# Patient Record
Sex: FEMALE | Race: WHITE | Employment: UNEMPLOYED | URBAN - NONMETROPOLITAN AREA
[De-identification: names, ages, dates, MRNs, and addresses within clinical notes are randomized per-mention and may not be internally consistent; named-entity substitution may affect disease eponyms.]

---

## 2017-05-05 ENCOUNTER — INITIAL PRENATAL (OUTPATIENT)
Dept: OBGYN | Age: 37
End: 2017-05-05
Payer: MEDICAID

## 2017-05-05 ENCOUNTER — HOSPITAL ENCOUNTER (OUTPATIENT)
Dept: ULTRASOUND IMAGING | Age: 37
Discharge: HOME | End: 2017-05-05
Payer: MEDICAID

## 2017-05-05 VITALS — HEART RATE: 88 BPM | WEIGHT: 255 LBS | DIASTOLIC BLOOD PRESSURE: 92 MMHG | SYSTOLIC BLOOD PRESSURE: 145 MMHG

## 2017-05-05 DIAGNOSIS — Z34.00 SUPERVISION OF NORMAL FIRST PREGNANCY, ANTEPARTUM: ICD-10-CM

## 2017-05-05 DIAGNOSIS — O10.919 CHRONIC HYPERTENSION IN PREGNANCY: ICD-10-CM

## 2017-05-05 DIAGNOSIS — O09.519: ICD-10-CM

## 2017-05-05 DIAGNOSIS — R12 HEARTBURN DURING PREGNANCY, UNSPECIFIED TRIMESTER: ICD-10-CM

## 2017-05-05 DIAGNOSIS — N91.2 AMENORRHEA: Primary | ICD-10-CM

## 2017-05-05 DIAGNOSIS — O21.9 NAUSEA AND VOMITING DURING PREGNANCY PRIOR TO 22 WEEKS GESTATION: ICD-10-CM

## 2017-05-05 DIAGNOSIS — Z36.89 CONFIRM FETAL CARDIAC ACTIVITY USING ULTRASOUND: ICD-10-CM

## 2017-05-05 DIAGNOSIS — O26.899 HEARTBURN DURING PREGNANCY, UNSPECIFIED TRIMESTER: ICD-10-CM

## 2017-05-05 LAB
ABO/RH: NORMAL
ANTIBODY SCREEN: NEGATIVE
CHLAMYDIA TRACHOMATIS AMPLIFIED DET: NEGATIVE
CONTROL: ABNORMAL
HCT: 40.4 %
HEPATITIS B SURFACE ANTIGEN INTERPRETATION: NORMAL
HGB: 13.5
HIV-1 AND HIV-2 ANTIBODIES: NORMAL
N GONORRHOEAE AMPLIFIED DET: NEGATIVE
PREGNANCY TEST URINE, POC: POSITIVE
RPR: NORMAL
RUBELLA ANTIBODY IGG: NORMAL
URINE PROTEIN CONC: NORMAL

## 2017-05-05 PROCEDURE — 99203 OFFICE O/P NEW LOW 30 MIN: CPT | Performed by: NURSE PRACTITIONER

## 2017-05-05 PROCEDURE — 81025 URINE PREGNANCY TEST: CPT | Performed by: NURSE PRACTITIONER

## 2017-05-05 PROCEDURE — 76817 TRANSVAGINAL US OBSTETRIC: CPT

## 2017-05-05 RX ORDER — PANTOPRAZOLE SODIUM 20 MG/1
20 TABLET, DELAYED RELEASE ORAL DAILY
Qty: 30 TABLET | Refills: 5 | Status: SHIPPED | OUTPATIENT
Start: 2017-05-05 | End: 2017-10-28 | Stop reason: SDUPTHER

## 2017-05-05 RX ORDER — ONDANSETRON 4 MG/1
4 TABLET, FILM COATED ORAL EVERY 4 HOURS PRN
Qty: 90 TABLET | Refills: 5 | Status: SHIPPED | OUTPATIENT
Start: 2017-05-05 | End: 2018-01-02

## 2017-05-05 RX ORDER — LABETALOL 100 MG/1
100 TABLET, FILM COATED ORAL 2 TIMES DAILY
Qty: 60 TABLET | Refills: 3 | Status: SHIPPED | OUTPATIENT
Start: 2017-05-05 | End: 2017-08-04 | Stop reason: SDUPTHER

## 2017-05-09 DIAGNOSIS — N39.0 URINARY TRACT INFECTION, SITE UNSPECIFIED: Primary | ICD-10-CM

## 2017-05-09 RX ORDER — NITROFURANTOIN 25; 75 MG/1; MG/1
100 CAPSULE ORAL 2 TIMES DAILY
Qty: 14 CAPSULE | Refills: 0 | Status: SHIPPED | OUTPATIENT
Start: 2017-05-09 | End: 2017-05-16

## 2017-05-12 ENCOUNTER — ROUTINE PRENATAL (OUTPATIENT)
Dept: OBGYN | Age: 37
End: 2017-05-12
Payer: MEDICAID

## 2017-05-12 VITALS
HEIGHT: 62 IN | DIASTOLIC BLOOD PRESSURE: 70 MMHG | SYSTOLIC BLOOD PRESSURE: 138 MMHG | BODY MASS INDEX: 47.11 KG/M2 | WEIGHT: 256 LBS

## 2017-05-12 DIAGNOSIS — O10.019 PRE-EXISTING ESSENTIAL HYPERTENSION DURING PREGNANCY, ANTEPARTUM: ICD-10-CM

## 2017-05-12 DIAGNOSIS — Z3A.10 10 WEEKS GESTATION OF PREGNANCY: Primary | ICD-10-CM

## 2017-05-12 PROCEDURE — 99213 OFFICE O/P EST LOW 20 MIN: CPT | Performed by: OBSTETRICS & GYNECOLOGY

## 2017-05-12 RX ORDER — AMPICILLIN 500 MG/1
500 CAPSULE ORAL 4 TIMES DAILY
Qty: 28 CAPSULE | Refills: 0 | Status: SHIPPED | OUTPATIENT
Start: 2017-05-12 | End: 2017-05-19

## 2017-05-16 ENCOUNTER — TELEPHONE (OUTPATIENT)
Dept: OBGYN | Age: 37
End: 2017-05-16

## 2017-05-16 DIAGNOSIS — O10.911 PRE-EXISTING HYPERTENSION AFFECTING PREGNANCY IN FIRST TRIMESTER: Primary | ICD-10-CM

## 2017-05-16 LAB
24HR URINE VOLUME (ML): 1100 ML
CREATININE 24 HOUR URINE: 1.3 G/24HR (ref 1–2)
PROTEIN 24 HOUR URINE: 165 MG/24HR (ref 50–100)

## 2017-05-22 ENCOUNTER — TELEPHONE (OUTPATIENT)
Dept: OBGYN | Age: 37
End: 2017-05-22

## 2017-06-01 ENCOUNTER — TELEPHONE (OUTPATIENT)
Dept: OBGYN | Age: 37
End: 2017-06-01

## 2017-06-13 ENCOUNTER — ROUTINE PRENATAL (OUTPATIENT)
Dept: OBGYN | Age: 37
End: 2017-06-13
Payer: MEDICAID

## 2017-06-13 VITALS — SYSTOLIC BLOOD PRESSURE: 136 MMHG | WEIGHT: 254 LBS | BODY MASS INDEX: 46.46 KG/M2 | DIASTOLIC BLOOD PRESSURE: 82 MMHG

## 2017-06-13 DIAGNOSIS — Z34.02 SUPERVISION OF NORMAL FIRST PREGNANCY IN SECOND TRIMESTER: Primary | ICD-10-CM

## 2017-06-13 PROCEDURE — 99212 OFFICE O/P EST SF 10 MIN: CPT | Performed by: OBSTETRICS & GYNECOLOGY

## 2017-06-13 RX ORDER — FLUOXETINE HYDROCHLORIDE 40 MG/1
40 CAPSULE ORAL DAILY
Qty: 30 CAPSULE | Refills: 3 | Status: SHIPPED | OUTPATIENT
Start: 2017-06-13 | End: 2017-10-01 | Stop reason: SDUPTHER

## 2017-06-13 RX ORDER — FERROUS SULFATE 325(65) MG
325 TABLET ORAL
Qty: 90 TABLET | Refills: 3 | Status: SHIPPED | OUTPATIENT
Start: 2017-06-13 | End: 2018-05-18 | Stop reason: SDUPTHER

## 2017-07-11 ENCOUNTER — ROUTINE PRENATAL (OUTPATIENT)
Dept: OBGYN | Age: 37
End: 2017-07-11
Payer: MEDICAID

## 2017-07-11 VITALS — SYSTOLIC BLOOD PRESSURE: 120 MMHG | DIASTOLIC BLOOD PRESSURE: 78 MMHG | BODY MASS INDEX: 45.54 KG/M2 | WEIGHT: 249 LBS

## 2017-07-11 DIAGNOSIS — Z3A.18 18 WEEKS GESTATION OF PREGNANCY: ICD-10-CM

## 2017-07-11 DIAGNOSIS — Z34.02 SUPERVISION OF NORMAL FIRST PREGNANCY IN SECOND TRIMESTER: Primary | ICD-10-CM

## 2017-07-11 PROCEDURE — 99212 OFFICE O/P EST SF 10 MIN: CPT | Performed by: OBSTETRICS & GYNECOLOGY

## 2017-07-17 ENCOUNTER — TELEPHONE (OUTPATIENT)
Dept: OBGYN | Age: 37
End: 2017-07-17

## 2017-07-18 ENCOUNTER — TELEPHONE (OUTPATIENT)
Dept: OBGYN | Age: 37
End: 2017-07-18

## 2017-07-25 ENCOUNTER — HOSPITAL ENCOUNTER (OUTPATIENT)
Dept: ULTRASOUND IMAGING | Age: 37
Discharge: HOME | End: 2017-07-25
Payer: MEDICAID

## 2017-07-25 DIAGNOSIS — Z34.02 SUPERVISION OF NORMAL FIRST PREGNANCY IN SECOND TRIMESTER: ICD-10-CM

## 2017-07-25 PROCEDURE — 76805 OB US >/= 14 WKS SNGL FETUS: CPT

## 2017-08-04 DIAGNOSIS — O10.919 CHRONIC HYPERTENSION IN PREGNANCY: ICD-10-CM

## 2017-08-04 RX ORDER — LABETALOL 100 MG/1
TABLET, FILM COATED ORAL
Qty: 60 TABLET | Refills: 2 | Status: SHIPPED | OUTPATIENT
Start: 2017-08-04 | End: 2017-09-29 | Stop reason: SDUPTHER

## 2017-08-08 ENCOUNTER — ROUTINE PRENATAL (OUTPATIENT)
Dept: OBGYN | Age: 37
End: 2017-08-08
Payer: MEDICAID

## 2017-08-08 VITALS
HEART RATE: 88 BPM | BODY MASS INDEX: 44.99 KG/M2 | SYSTOLIC BLOOD PRESSURE: 131 MMHG | DIASTOLIC BLOOD PRESSURE: 77 MMHG | WEIGHT: 246 LBS

## 2017-08-08 DIAGNOSIS — Z34.02 SUPERVISION OF NORMAL FIRST PREGNANCY IN SECOND TRIMESTER: Primary | ICD-10-CM

## 2017-08-08 PROCEDURE — 99212 OFFICE O/P EST SF 10 MIN: CPT | Performed by: OBSTETRICS & GYNECOLOGY

## 2017-09-05 ENCOUNTER — ROUTINE PRENATAL (OUTPATIENT)
Dept: OBGYN | Age: 37
End: 2017-09-05
Payer: MEDICAID

## 2017-09-05 ENCOUNTER — HOSPITAL ENCOUNTER (OUTPATIENT)
Dept: LABOR AND DELIVERY | Age: 37
Discharge: HOME | End: 2017-09-05
Payer: MEDICAID

## 2017-09-05 VITALS
WEIGHT: 248 LBS | HEART RATE: 92 BPM | SYSTOLIC BLOOD PRESSURE: 136 MMHG | DIASTOLIC BLOOD PRESSURE: 80 MMHG | BODY MASS INDEX: 45.36 KG/M2

## 2017-09-05 DIAGNOSIS — Z67.41 TYPE O BLOOD, RH NEGATIVE: ICD-10-CM

## 2017-09-05 DIAGNOSIS — Z34.92 PRENATAL CARE IN SECOND TRIMESTER: Primary | ICD-10-CM

## 2017-09-05 LAB
ABO/RH: NORMAL
ANTIBODY SCREEN: NORMAL
RHIG LOT NUMBER: NORMAL

## 2017-09-05 PROCEDURE — 6360000002 HC RX W HCPCS: Performed by: NURSE PRACTITIONER

## 2017-09-05 PROCEDURE — 99213 OFFICE O/P EST LOW 20 MIN: CPT | Performed by: NURSE PRACTITIONER

## 2017-09-05 PROCEDURE — 96372 THER/PROPH/DIAG INJ SC/IM: CPT

## 2017-09-05 RX ADMIN — HUMAN RHO(D) IMMUNE GLOBULIN 300 MCG: 300 INJECTION, SOLUTION INTRAMUSCULAR at 20:00

## 2017-09-07 ENCOUNTER — TELEPHONE (OUTPATIENT)
Dept: OBGYN | Age: 37
End: 2017-09-07

## 2017-09-07 DIAGNOSIS — Z34.92 PRENATAL CARE IN SECOND TRIMESTER: ICD-10-CM

## 2017-09-19 ENCOUNTER — ROUTINE PRENATAL (OUTPATIENT)
Dept: OBGYN | Age: 37
End: 2017-09-19
Payer: MEDICAID

## 2017-09-19 VITALS — DIASTOLIC BLOOD PRESSURE: 84 MMHG | BODY MASS INDEX: 45.36 KG/M2 | SYSTOLIC BLOOD PRESSURE: 140 MMHG | WEIGHT: 248 LBS

## 2017-09-19 DIAGNOSIS — O24.415 GESTATIONAL DIABETES MELLITUS (GDM) CONTROLLED ON ORAL HYPOGLYCEMIC DRUG, ANTEPARTUM: Primary | ICD-10-CM

## 2017-09-19 PROCEDURE — 59025 FETAL NON-STRESS TEST: CPT | Performed by: OBSTETRICS & GYNECOLOGY

## 2017-09-19 PROCEDURE — 99212 OFFICE O/P EST SF 10 MIN: CPT | Performed by: OBSTETRICS & GYNECOLOGY

## 2017-09-19 RX ORDER — GLYBURIDE 2.5 MG/1
2.5 TABLET ORAL 2 TIMES DAILY WITH MEALS
Qty: 60 TABLET | Refills: 5 | Status: SHIPPED | OUTPATIENT
Start: 2017-09-19 | End: 2017-10-31 | Stop reason: SDUPTHER

## 2017-09-20 RX ORDER — VALACYCLOVIR HYDROCHLORIDE 500 MG/1
500 TABLET, FILM COATED ORAL 2 TIMES DAILY
Qty: 30 TABLET | Refills: 0 | Status: ON HOLD | OUTPATIENT
Start: 2017-09-20 | End: 2017-11-20 | Stop reason: HOSPADM

## 2017-09-29 ENCOUNTER — ROUTINE PRENATAL (OUTPATIENT)
Dept: OBGYN | Age: 37
End: 2017-09-29
Payer: MEDICAID

## 2017-09-29 VITALS — DIASTOLIC BLOOD PRESSURE: 90 MMHG | BODY MASS INDEX: 45.73 KG/M2 | SYSTOLIC BLOOD PRESSURE: 154 MMHG | WEIGHT: 250 LBS

## 2017-09-29 DIAGNOSIS — O24.415 GESTATIONAL DIABETES MELLITUS (GDM) IN THIRD TRIMESTER CONTROLLED ON ORAL HYPOGLYCEMIC DRUG: Primary | ICD-10-CM

## 2017-09-29 DIAGNOSIS — O10.919 CHRONIC HYPERTENSION IN PREGNANCY: ICD-10-CM

## 2017-09-29 PROCEDURE — 99212 OFFICE O/P EST SF 10 MIN: CPT | Performed by: OBSTETRICS & GYNECOLOGY

## 2017-09-29 RX ORDER — LABETALOL 100 MG/1
200 TABLET, FILM COATED ORAL 2 TIMES DAILY
Qty: 120 TABLET | Refills: 5 | Status: ON HOLD | OUTPATIENT
Start: 2017-09-29 | End: 2017-11-20 | Stop reason: HOSPADM

## 2017-10-02 RX ORDER — FLUOXETINE HYDROCHLORIDE 40 MG/1
CAPSULE ORAL
Qty: 30 CAPSULE | Refills: 0 | Status: SHIPPED | OUTPATIENT
Start: 2017-10-02 | End: 2017-10-29 | Stop reason: SDUPTHER

## 2017-10-06 ENCOUNTER — ROUTINE PRENATAL (OUTPATIENT)
Dept: OBGYN | Age: 37
End: 2017-10-06
Payer: MEDICAID

## 2017-10-06 VITALS — DIASTOLIC BLOOD PRESSURE: 80 MMHG | BODY MASS INDEX: 45.54 KG/M2 | SYSTOLIC BLOOD PRESSURE: 130 MMHG | WEIGHT: 249 LBS

## 2017-10-06 DIAGNOSIS — O24.415 GESTATIONAL DIABETES MELLITUS IN PREGNANCY, CONTROLLED BY ORAL HYPOGLYCEMIC DRUGS: Primary | ICD-10-CM

## 2017-10-06 DIAGNOSIS — O10.919 CHRONIC HYPERTENSION IN PREGNANCY: ICD-10-CM

## 2017-10-06 PROCEDURE — 99212 OFFICE O/P EST SF 10 MIN: CPT | Performed by: OBSTETRICS & GYNECOLOGY

## 2017-10-06 NOTE — PATIENT INSTRUCTIONS
Weeks 30 to 32 of Your Pregnancy: Care Instructions  Your Care Instructions    You have made it to the final months of your pregnancy. By now, your baby is really starting to look like a baby, with hair and plump skin. As you enter the final weeks of pregnancy, the reality of having a baby may start to set in. This is the time to settle on a name, get your household in order, set up a safe nursery, and find quality  if needed. Doing these things in advance will allow you to focus on caring for and enjoying your new baby. You may also want to have a tour of your hospital's labor and delivery unit to get a better idea of what to expect while you are in the hospital.  During these last months, it is very important to take good care of yourself and pay attention to what your body needs. If your doctor says it is okay for you to work, don't push yourself too hard. Use the tips provided in this care sheet to ease heartburn and care for varicose veins. If you haven't already had the Tdap shot during this pregnancy, talk to your doctor about getting it. It will help protect your  against pertussis infection. Follow-up care is a key part of your treatment and safety. Be sure to make and go to all appointments, and call your doctor if you are having problems. It's also a good idea to know your test results and keep a list of the medicines you take. How can you care for yourself at home? Pay attention to your baby's movements  · You should feel your baby move several times every day. · Your baby now turns less, and kicks and jabs more. · Your baby sleeps 20 to 45 minutes at a time and is more active at certain times of day. · If your doctor wants you to count your baby's kicks:  ¨ Empty your bladder, and lie on your side or relax in a comfortable chair. ¨ Write down your start time. ¨ Pay attention only to your baby's movements. Count any movement except hiccups.   ¨ After you have counted 10

## 2017-10-11 ENCOUNTER — ROUTINE PRENATAL (OUTPATIENT)
Dept: OBGYN | Age: 37
End: 2017-10-11
Payer: MEDICAID

## 2017-10-11 VITALS
SYSTOLIC BLOOD PRESSURE: 119 MMHG | WEIGHT: 249 LBS | DIASTOLIC BLOOD PRESSURE: 65 MMHG | BODY MASS INDEX: 45.54 KG/M2 | HEART RATE: 84 BPM

## 2017-10-11 DIAGNOSIS — O24.415 GESTATIONAL DIABETES MELLITUS IN PREGNANCY, CONTROLLED BY ORAL HYPOGLYCEMIC DRUGS: Primary | ICD-10-CM

## 2017-10-11 DIAGNOSIS — O10.919 CHRONIC HYPERTENSION IN PREGNANCY: ICD-10-CM

## 2017-10-11 PROCEDURE — 59025 FETAL NON-STRESS TEST: CPT | Performed by: OBSTETRICS & GYNECOLOGY

## 2017-10-11 PROCEDURE — 99212 OFFICE O/P EST SF 10 MIN: CPT | Performed by: OBSTETRICS & GYNECOLOGY

## 2017-10-11 NOTE — PATIENT INSTRUCTIONS
We are committed to providing you with the best care possible. In order to help us achieve these goals please remember to bring all medications, herbal products, and over the counter supplements with you to each visit. If your provider has ordered testing for you, please be sure to follow up with our office if you have not received results within 7 days after testing took place. *If you receive a survey after visiting one of our offices, please take the time to share your experience concerning your physician office visit. These surveys are confidential and no health information about you is shared. We are eager to improve for you and we are counting on your feedback to help make that happen.

## 2017-10-20 ENCOUNTER — ROUTINE PRENATAL (OUTPATIENT)
Dept: OBGYN | Age: 37
End: 2017-10-20
Payer: MEDICAID

## 2017-10-20 VITALS
HEART RATE: 85 BPM | WEIGHT: 250 LBS | DIASTOLIC BLOOD PRESSURE: 60 MMHG | SYSTOLIC BLOOD PRESSURE: 126 MMHG | BODY MASS INDEX: 45.73 KG/M2

## 2017-10-20 DIAGNOSIS — O24.415 GESTATIONAL DIABETES MELLITUS IN PREGNANCY, CONTROLLED BY ORAL HYPOGLYCEMIC DRUGS: Primary | ICD-10-CM

## 2017-10-20 DIAGNOSIS — O10.919 CHRONIC HYPERTENSION AFFECTING PREGNANCY: ICD-10-CM

## 2017-10-20 PROCEDURE — 99212 OFFICE O/P EST SF 10 MIN: CPT | Performed by: OBSTETRICS & GYNECOLOGY

## 2017-10-20 NOTE — PATIENT INSTRUCTIONS
Weeks 32 to 34 of Your Pregnancy: Care Instructions  Your Care Instructions    During the last few weeks of your pregnancy, you may have more aches and pains. It's important to rest when you can. Your growing baby is putting more pressure on your bladder. So you may need to urinate more often. Hemorrhoids are also common. These are painful, itchy veins in the rectal area. In the 36th week, most women have a test for group B streptococcus (GBS). GBS is a common bacteria that can live in the vagina and rectum. It can make your baby sick after birth. If you test positive, you will get antibiotics during labor. These will keep your baby from getting the bacteria. You may want to talk with your doctor about banking your baby's umbilical cord blood. This is the blood left in the cord after birth. If you want to save this blood, you must arrange it ahead of time. You can't decide at the last minute. If you haven't already had the Tdap shot during this pregnancy, talk to your doctor about getting it. It will help protect your  against pertussis infection. Follow-up care is a key part of your treatment and safety. Be sure to make and go to all appointments, and call your doctor if you are having problems. It's also a good idea to know your test results and keep a list of the medicines you take. How can you care for yourself at home? Ease hemorrhoids  · Get more liquids, fruits, vegetables, and fiber in your diet. This will help keep your stools soft. · Avoid sitting for too long. Lie on your left side several times a day. · Clean yourself with soft, moist toilet paper. Or you can use witch hazel pads or personal hygiene pads. · If you are uncomfortable, try ice packs. Or you can sit in a warm sitz bath. Do these for 20 minutes at a time, as needed. · Use hydrocortisone cream for pain and itching. Two examples are Anusol and Preparation H Hydrocortisone.   · Ask your doctor about taking an Incorporated disclaims any warranty or liability for your use of this information.

## 2017-10-30 RX ORDER — PANTOPRAZOLE SODIUM 20 MG/1
TABLET, DELAYED RELEASE ORAL
Qty: 30 TABLET | Refills: 5 | Status: ON HOLD | OUTPATIENT
Start: 2017-10-30 | End: 2017-11-20 | Stop reason: HOSPADM

## 2017-10-30 RX ORDER — FLUOXETINE HYDROCHLORIDE 40 MG/1
CAPSULE ORAL
Qty: 30 CAPSULE | Refills: 0 | Status: SHIPPED | OUTPATIENT
Start: 2017-10-30 | End: 2017-12-22 | Stop reason: SDUPTHER

## 2017-10-31 ENCOUNTER — ROUTINE PRENATAL (OUTPATIENT)
Dept: OBGYN | Age: 37
End: 2017-10-31
Payer: MEDICAID

## 2017-10-31 VITALS
SYSTOLIC BLOOD PRESSURE: 133 MMHG | WEIGHT: 251 LBS | BODY MASS INDEX: 45.91 KG/M2 | HEART RATE: 92 BPM | DIASTOLIC BLOOD PRESSURE: 82 MMHG

## 2017-10-31 DIAGNOSIS — O24.415 GESTATIONAL DIABETES MELLITUS IN PREGNANCY, CONTROLLED BY ORAL HYPOGLYCEMIC DRUGS: Primary | ICD-10-CM

## 2017-10-31 DIAGNOSIS — O10.919 CHRONIC HYPERTENSION DURING PREGNANCY: ICD-10-CM

## 2017-10-31 PROCEDURE — 99212 OFFICE O/P EST SF 10 MIN: CPT | Performed by: OBSTETRICS & GYNECOLOGY

## 2017-10-31 PROCEDURE — 59025 FETAL NON-STRESS TEST: CPT | Performed by: OBSTETRICS & GYNECOLOGY

## 2017-10-31 RX ORDER — GLYBURIDE 2.5 MG/1
TABLET ORAL
Qty: 90 TABLET | Refills: 5 | Status: ON HOLD | OUTPATIENT
Start: 2017-10-31 | End: 2017-11-20 | Stop reason: HOSPADM

## 2017-10-31 NOTE — PROGRESS NOTES
Pt presents today for a routine prenatal visit. Pt denies vaginal bleeding, leaking of fluid or cramping. Pt states + fetal movement. NST today showed baseline FHR in 130's, moderate variability, + acels, no decels, b/p WNL's. FS have been running high. Pt will increase Diabeta to 5 mg in am and 2.5 mg in pm. All questions answered.

## 2017-10-31 NOTE — PATIENT INSTRUCTIONS
Weeks 34 to 36 of Your Pregnancy: Care Instructions  Your Care Instructions    By now, your baby and your belly have grown quite large. It is almost time to give birth. A full-term pregnancy can deliver between 37 and 42 weeks. Your baby's lungs are almost ready to breathe air. The bones in your baby's head are now firm enough to protect it, but soft enough to move down through the birth canal.  You may feel excited, happy, anxious, or scared. You may wonder how you will know if you are in labor or what to expect during labor. Try to be flexible in your expectations of the birth. Because each birth is different, there is no way to know exactly what childbirth will be like for you. This care sheet will help you know what to expect and how to prepare. This may make your childbirth easier. If you haven't already had the Tdap shot during this pregnancy, talk to your doctor about getting it. It will help protect your  against pertussis infection. In the 36th week, most women have a test for group B streptococcus (GBS). GBS is a common bacteria that can live in the vagina and rectum. It can make your baby sick after birth. If you test positive, you will get antibiotics during labor. The medicine will keep your baby from getting the bacteria. Follow-up care is a key part of your treatment and safety. Be sure to make and go to all appointments, and call your doctor if you are having problems. It's also a good idea to know your test results and keep a list of the medicines you take. How can you care for yourself at home? Learn about pain relief choices  · Pain is different for every woman. Talk with your doctor about your feelings about pain. · You can choose from several types of pain relief. These include medicine or breathing techniques, as well as comfort measures. You can use more than one option. · If you choose to have pain medicine during labor, talk to your doctor about your options.  Some

## 2017-11-01 ENCOUNTER — TELEPHONE (OUTPATIENT)
Dept: OBGYN | Age: 37
End: 2017-11-01

## 2017-11-07 ENCOUNTER — ROUTINE PRENATAL (OUTPATIENT)
Dept: OBGYN | Age: 37
End: 2017-11-07
Payer: MEDICAID

## 2017-11-07 ENCOUNTER — HOSPITAL ENCOUNTER (OUTPATIENT)
Age: 37
Setting detail: OBSERVATION
Discharge: HOME | End: 2017-11-07
Attending: OBSTETRICS & GYNECOLOGY | Admitting: OBSTETRICS & GYNECOLOGY
Payer: MEDICAID

## 2017-11-07 VITALS
BODY MASS INDEX: 46.46 KG/M2 | HEART RATE: 85 BPM | SYSTOLIC BLOOD PRESSURE: 157 MMHG | WEIGHT: 254 LBS | DIASTOLIC BLOOD PRESSURE: 102 MMHG

## 2017-11-07 VITALS — HEART RATE: 89 BPM | DIASTOLIC BLOOD PRESSURE: 62 MMHG | SYSTOLIC BLOOD PRESSURE: 149 MMHG

## 2017-11-07 DIAGNOSIS — O24.415 GESTATIONAL DIABETES MELLITUS IN PREGNANCY, CONTROLLED BY ORAL HYPOGLYCEMIC DRUGS: Primary | ICD-10-CM

## 2017-11-07 DIAGNOSIS — O10.919 CHRONIC HYPERTENSION AFFECTING PREGNANCY: ICD-10-CM

## 2017-11-07 LAB
ALBUMIN SERPL-MCNC: 3.1 G/DL (ref 3.5–5.2)
ALP BLD-CCNC: 175 U/L (ref 35–104)
ALT SERPL-CCNC: 21 U/L (ref 5–33)
ANION GAP SERPL CALCULATED.3IONS-SCNC: 12 MMOL/L (ref 7–19)
AST SERPL-CCNC: 21 U/L (ref 5–32)
BASOPHILS ABSOLUTE: 0 K/UL (ref 0–0.2)
BASOPHILS RELATIVE PERCENT: 0.3 % (ref 0–1)
BILIRUB SERPL-MCNC: 0.3 MG/DL (ref 0.2–1.2)
BILIRUBIN URINE: NEGATIVE
BLOOD, URINE: NEGATIVE
BUN BLDV-MCNC: 6 MG/DL (ref 6–20)
CALCIUM SERPL-MCNC: 9.3 MG/DL (ref 8.6–10)
CHLORIDE BLD-SCNC: 103 MMOL/L (ref 98–111)
CLARITY: CLEAR
CO2: 23 MMOL/L (ref 22–29)
COLOR: YELLOW
CREAT SERPL-MCNC: 0.5 MG/DL (ref 0.5–0.9)
CREATININE URINE: 86.9 MG/DL (ref 4.2–622)
EOSINOPHILS ABSOLUTE: 0.1 K/UL (ref 0–0.6)
EOSINOPHILS RELATIVE PERCENT: 0.5 % (ref 0–5)
GFR NON-AFRICAN AMERICAN: >60
GLUCOSE BLD-MCNC: 84 MG/DL (ref 74–109)
GLUCOSE URINE: NEGATIVE MG/DL
HCT VFR BLD CALC: 38.9 % (ref 37–47)
HEMOGLOBIN: 12.9 G/DL (ref 12–16)
KETONES, URINE: ABNORMAL MG/DL
LEUKOCYTE ESTERASE, URINE: NEGATIVE
LYMPHOCYTES ABSOLUTE: 1.5 K/UL (ref 1.1–4.5)
LYMPHOCYTES RELATIVE PERCENT: 15.4 % (ref 20–40)
MCH RBC QN AUTO: 27.9 PG (ref 27–31)
MCHC RBC AUTO-ENTMCNC: 33.2 G/DL (ref 33–37)
MCV RBC AUTO: 84 FL (ref 81–99)
MONOCYTES ABSOLUTE: 0.5 K/UL (ref 0–0.9)
MONOCYTES RELATIVE PERCENT: 5 % (ref 0–10)
NEUTROPHILS ABSOLUTE: 7.8 K/UL (ref 1.5–7.5)
NEUTROPHILS RELATIVE PERCENT: 78.2 % (ref 50–65)
NITRITE, URINE: NEGATIVE
PDW BLD-RTO: 15.9 % (ref 11.5–14.5)
PH UA: 6.5
PLATELET # BLD: 204 K/UL (ref 130–400)
PMV BLD AUTO: 10.6 FL (ref 9.4–12.3)
POTASSIUM SERPL-SCNC: 3.7 MMOL/L (ref 3.5–5)
PROTEIN PROTEIN: 24 MG/DL (ref 15–45)
PROTEIN UA: NEGATIVE MG/DL
RBC # BLD: 4.63 M/UL (ref 4.2–5.4)
SODIUM BLD-SCNC: 138 MMOL/L (ref 136–145)
SPECIFIC GRAVITY UA: 1.02
TOTAL PROTEIN: 6.6 G/DL (ref 6.6–8.7)
UROBILINOGEN, URINE: 1 E.U./DL
WBC # BLD: 10 K/UL (ref 4.8–10.8)

## 2017-11-07 PROCEDURE — 85025 COMPLETE CBC W/AUTO DIFF WBC: CPT

## 2017-11-07 PROCEDURE — 36415 COLL VENOUS BLD VENIPUNCTURE: CPT

## 2017-11-07 PROCEDURE — 59025 FETAL NON-STRESS TEST: CPT

## 2017-11-07 PROCEDURE — 84156 ASSAY OF PROTEIN URINE: CPT

## 2017-11-07 PROCEDURE — 59025 FETAL NON-STRESS TEST: CPT | Performed by: OBSTETRICS & GYNECOLOGY

## 2017-11-07 PROCEDURE — 80053 COMPREHEN METABOLIC PANEL: CPT

## 2017-11-07 PROCEDURE — 99212 OFFICE O/P EST SF 10 MIN: CPT | Performed by: OBSTETRICS & GYNECOLOGY

## 2017-11-07 PROCEDURE — G0378 HOSPITAL OBSERVATION PER HR: HCPCS

## 2017-11-07 PROCEDURE — 81003 URINALYSIS AUTO W/O SCOPE: CPT

## 2017-11-07 PROCEDURE — 82570 ASSAY OF URINE CREATININE: CPT

## 2017-11-07 RX ORDER — GLYBURIDE 2.5 MG/1
2.5 TABLET ORAL 2 TIMES DAILY WITH MEALS
Status: DISCONTINUED | OUTPATIENT
Start: 2017-11-07 | End: 2017-11-07 | Stop reason: HOSPADM

## 2017-11-07 RX ORDER — ONDANSETRON 2 MG/ML
4 INJECTION INTRAMUSCULAR; INTRAVENOUS EVERY 6 HOURS PRN
Status: DISCONTINUED | OUTPATIENT
Start: 2017-11-07 | End: 2017-11-07 | Stop reason: HOSPADM

## 2017-11-07 RX ORDER — LABETALOL 100 MG/1
100 TABLET, FILM COATED ORAL 2 TIMES DAILY
Status: DISCONTINUED | OUTPATIENT
Start: 2017-11-07 | End: 2017-11-07 | Stop reason: HOSPADM

## 2017-11-07 RX ORDER — ALBUTEROL SULFATE 90 UG/1
2 AEROSOL, METERED RESPIRATORY (INHALATION) EVERY 6 HOURS PRN
COMMUNITY

## 2017-11-07 RX ORDER — ACETAMINOPHEN 325 MG/1
650 TABLET ORAL EVERY 4 HOURS PRN
Status: DISCONTINUED | OUTPATIENT
Start: 2017-11-07 | End: 2017-11-07 | Stop reason: HOSPADM

## 2017-11-07 RX ORDER — SODIUM CHLORIDE 0.9 % (FLUSH) 0.9 %
10 SYRINGE (ML) INJECTION PRN
Status: DISCONTINUED | OUTPATIENT
Start: 2017-11-07 | End: 2017-11-07 | Stop reason: HOSPADM

## 2017-11-07 RX ORDER — FLUOXETINE HYDROCHLORIDE 20 MG/1
40 CAPSULE ORAL DAILY
Status: DISCONTINUED | OUTPATIENT
Start: 2017-11-07 | End: 2017-11-07 | Stop reason: HOSPADM

## 2017-11-07 RX ORDER — SODIUM CHLORIDE 0.9 % (FLUSH) 0.9 %
10 SYRINGE (ML) INJECTION EVERY 12 HOURS SCHEDULED
Status: DISCONTINUED | OUTPATIENT
Start: 2017-11-07 | End: 2017-11-07 | Stop reason: HOSPADM

## 2017-11-07 RX ORDER — FERROUS SULFATE 325(65) MG
325 TABLET ORAL 2 TIMES DAILY WITH MEALS
Status: DISCONTINUED | OUTPATIENT
Start: 2017-11-07 | End: 2017-11-07 | Stop reason: HOSPADM

## 2017-11-07 RX ORDER — PANTOPRAZOLE SODIUM 20 MG/1
20 TABLET, DELAYED RELEASE ORAL
Status: DISCONTINUED | OUTPATIENT
Start: 2017-11-08 | End: 2017-11-07 | Stop reason: HOSPADM

## 2017-11-10 ENCOUNTER — HOSPITAL ENCOUNTER (INPATIENT)
Age: 37
LOS: 10 days | Discharge: HOME | End: 2017-11-20
Attending: OBSTETRICS & GYNECOLOGY | Admitting: OBSTETRICS & GYNECOLOGY
Payer: MEDICAID

## 2017-11-10 ENCOUNTER — ANESTHESIA EVENT (OUTPATIENT)
Dept: MOTHER INFANT UNIT | Age: 37
End: 2017-11-10
Payer: MEDICAID

## 2017-11-10 ENCOUNTER — ANESTHESIA (OUTPATIENT)
Dept: MOTHER INFANT UNIT | Age: 37
End: 2017-11-10
Payer: MEDICAID

## 2017-11-10 ENCOUNTER — ROUTINE PRENATAL (OUTPATIENT)
Dept: OBGYN | Age: 37
End: 2017-11-10
Payer: MEDICAID

## 2017-11-10 VITALS — WEIGHT: 253 LBS | BODY MASS INDEX: 46.27 KG/M2 | SYSTOLIC BLOOD PRESSURE: 150 MMHG | DIASTOLIC BLOOD PRESSURE: 102 MMHG

## 2017-11-10 VITALS — DIASTOLIC BLOOD PRESSURE: 39 MMHG | SYSTOLIC BLOOD PRESSURE: 104 MMHG | OXYGEN SATURATION: 93 %

## 2017-11-10 DIAGNOSIS — O10.919 CHRONIC HYPERTENSION AFFECTING PREGNANCY: ICD-10-CM

## 2017-11-10 DIAGNOSIS — O24.415 GESTATIONAL DIABETES MELLITUS (GDM) IN THIRD TRIMESTER CONTROLLED ON ORAL HYPOGLYCEMIC DRUG: Primary | ICD-10-CM

## 2017-11-10 LAB
ABO/RH: NORMAL
ALBUMIN SERPL-MCNC: 3.1 G/DL (ref 3.5–5.2)
ALP BLD-CCNC: 168 U/L (ref 35–104)
ALT SERPL-CCNC: 27 U/L (ref 5–33)
ANION GAP SERPL CALCULATED.3IONS-SCNC: 14 MMOL/L (ref 7–19)
ANTIBODY SCREEN: NORMAL
AST SERPL-CCNC: 28 U/L (ref 5–32)
BASOPHILS ABSOLUTE: 0 K/UL (ref 0–0.2)
BASOPHILS ABSOLUTE: 0 K/UL (ref 0–0.2)
BASOPHILS RELATIVE PERCENT: 0.1 % (ref 0–1)
BASOPHILS RELATIVE PERCENT: 0.2 % (ref 0–1)
BILIRUB SERPL-MCNC: 0.3 MG/DL (ref 0.2–1.2)
BUN BLDV-MCNC: 5 MG/DL (ref 6–20)
CALCIUM SERPL-MCNC: 9.2 MG/DL (ref 8.6–10)
CHLORIDE BLD-SCNC: 105 MMOL/L (ref 98–111)
CO2: 22 MMOL/L (ref 22–29)
CREAT SERPL-MCNC: 0.5 MG/DL (ref 0.5–0.9)
CREATININE URINE: 203.5 MG/DL (ref 4.2–622)
EOSINOPHILS ABSOLUTE: 0.1 K/UL (ref 0–0.6)
EOSINOPHILS ABSOLUTE: 0.1 K/UL (ref 0–0.6)
EOSINOPHILS RELATIVE PERCENT: 0.9 % (ref 0–5)
EOSINOPHILS RELATIVE PERCENT: 1.1 % (ref 0–5)
GFR NON-AFRICAN AMERICAN: >60
GLUCOSE BLD-MCNC: 50 MG/DL (ref 70–99)
GLUCOSE BLD-MCNC: 64 MG/DL (ref 74–109)
GROUP B STREP ANTIGEN: NEGATIVE
HCT VFR BLD CALC: 36 % (ref 37–47)
HCT VFR BLD CALC: 36.2 % (ref 37–47)
HEMOGLOBIN: 11.8 G/DL (ref 12–16)
HEMOGLOBIN: 12 G/DL (ref 12–16)
LYMPHOCYTES ABSOLUTE: 1.2 K/UL (ref 1.1–4.5)
LYMPHOCYTES ABSOLUTE: 1.2 K/UL (ref 1.1–4.5)
LYMPHOCYTES RELATIVE PERCENT: 12.7 % (ref 20–40)
LYMPHOCYTES RELATIVE PERCENT: 12.8 % (ref 20–40)
MCH RBC QN AUTO: 27.8 PG (ref 27–31)
MCH RBC QN AUTO: 28 PG (ref 27–31)
MCHC RBC AUTO-ENTMCNC: 32.8 G/DL (ref 33–37)
MCHC RBC AUTO-ENTMCNC: 33.1 G/DL (ref 33–37)
MCV RBC AUTO: 84.4 FL (ref 81–99)
MCV RBC AUTO: 84.7 FL (ref 81–99)
MONOCYTES ABSOLUTE: 0.5 K/UL (ref 0–0.9)
MONOCYTES ABSOLUTE: 0.8 K/UL (ref 0–0.9)
MONOCYTES RELATIVE PERCENT: 6 % (ref 0–10)
MONOCYTES RELATIVE PERCENT: 8.3 % (ref 0–10)
NEUTROPHILS ABSOLUTE: 7.2 K/UL (ref 1.5–7.5)
NEUTROPHILS ABSOLUTE: 7.2 K/UL (ref 1.5–7.5)
NEUTROPHILS RELATIVE PERCENT: 76.9 % (ref 50–65)
NEUTROPHILS RELATIVE PERCENT: 79.6 % (ref 50–65)
PDW BLD-RTO: 15.9 % (ref 11.5–14.5)
PDW BLD-RTO: 15.9 % (ref 11.5–14.5)
PERFORMED ON: ABNORMAL
PLATELET # BLD: 167 K/UL (ref 130–400)
PLATELET # BLD: 197 K/UL (ref 130–400)
PMV BLD AUTO: 10.2 FL (ref 9.4–12.3)
PMV BLD AUTO: 10.8 FL (ref 9.4–12.3)
POTASSIUM SERPL-SCNC: 4.1 MMOL/L (ref 3.5–5)
PROTEIN PROTEIN: 67 MG/DL (ref 15–45)
RBC # BLD: 4.25 M/UL (ref 4.2–5.4)
RBC # BLD: 4.29 M/UL (ref 4.2–5.4)
SODIUM BLD-SCNC: 141 MMOL/L (ref 136–145)
TOTAL PROTEIN: 6.2 G/DL (ref 6.6–8.7)
WBC # BLD: 9 K/UL (ref 4.8–10.8)
WBC # BLD: 9.3 K/UL (ref 4.8–10.8)

## 2017-11-10 PROCEDURE — 6360000002 HC RX W HCPCS: Performed by: OBSTETRICS & GYNECOLOGY

## 2017-11-10 PROCEDURE — 86900 BLOOD TYPING SEROLOGIC ABO: CPT

## 2017-11-10 PROCEDURE — 3700000000 HC ANESTHESIA ATTENDED CARE: Performed by: OBSTETRICS & GYNECOLOGY

## 2017-11-10 PROCEDURE — 82570 ASSAY OF URINE CREATININE: CPT

## 2017-11-10 PROCEDURE — 82948 REAGENT STRIP/BLOOD GLUCOSE: CPT

## 2017-11-10 PROCEDURE — 1220000000 HC SEMI PRIVATE OB R&B

## 2017-11-10 PROCEDURE — 59025 FETAL NON-STRESS TEST: CPT

## 2017-11-10 PROCEDURE — 6370000000 HC RX 637 (ALT 250 FOR IP): Performed by: OBSTETRICS & GYNECOLOGY

## 2017-11-10 PROCEDURE — 2500000003 HC RX 250 WO HCPCS: Performed by: NURSE ANESTHETIST, CERTIFIED REGISTERED

## 2017-11-10 PROCEDURE — S0028 INJECTION, FAMOTIDINE, 20 MG: HCPCS | Performed by: NURSE ANESTHETIST, CERTIFIED REGISTERED

## 2017-11-10 PROCEDURE — 86592 SYPHILIS TEST NON-TREP QUAL: CPT

## 2017-11-10 PROCEDURE — 94640 AIRWAY INHALATION TREATMENT: CPT

## 2017-11-10 PROCEDURE — 84156 ASSAY OF PROTEIN URINE: CPT

## 2017-11-10 PROCEDURE — 3700000001 HC ADD 15 MINUTES (ANESTHESIA): Performed by: OBSTETRICS & GYNECOLOGY

## 2017-11-10 PROCEDURE — 80053 COMPREHEN METABOLIC PANEL: CPT

## 2017-11-10 PROCEDURE — 94664 DEMO&/EVAL PT USE INHALER: CPT

## 2017-11-10 PROCEDURE — 85025 COMPLETE CBC W/AUTO DIFF WBC: CPT

## 2017-11-10 PROCEDURE — 7100000001 HC PACU RECOVERY - ADDTL 15 MIN: Performed by: OBSTETRICS & GYNECOLOGY

## 2017-11-10 PROCEDURE — 7100000000 HC PACU RECOVERY - FIRST 15 MIN: Performed by: OBSTETRICS & GYNECOLOGY

## 2017-11-10 PROCEDURE — 99212 OFFICE O/P EST SF 10 MIN: CPT | Performed by: OBSTETRICS & GYNECOLOGY

## 2017-11-10 PROCEDURE — 86850 RBC ANTIBODY SCREEN: CPT

## 2017-11-10 PROCEDURE — 4A1HXCZ MONITORING OF PRODUCTS OF CONCEPTION, CARDIAC RATE, EXTERNAL APPROACH: ICD-10-PCS | Performed by: OBSTETRICS & GYNECOLOGY

## 2017-11-10 PROCEDURE — 2720000003 HC MISC SUTURE/STAPLES/RELOADS/ETC: Performed by: OBSTETRICS & GYNECOLOGY

## 2017-11-10 PROCEDURE — 36415 COLL VENOUS BLD VENIPUNCTURE: CPT

## 2017-11-10 PROCEDURE — 3609079900 HC CESAREAN SECTION: Performed by: OBSTETRICS & GYNECOLOGY

## 2017-11-10 PROCEDURE — 2580000003 HC RX 258: Performed by: OBSTETRICS & GYNECOLOGY

## 2017-11-10 PROCEDURE — 6360000002 HC RX W HCPCS: Performed by: NURSE ANESTHETIST, CERTIFIED REGISTERED

## 2017-11-10 PROCEDURE — 86901 BLOOD TYPING SEROLOGIC RH(D): CPT

## 2017-11-10 RX ORDER — KETOROLAC TROMETHAMINE 30 MG/ML
30 INJECTION, SOLUTION INTRAMUSCULAR; INTRAVENOUS EVERY 6 HOURS
Status: DISPENSED | OUTPATIENT
Start: 2017-11-11 | End: 2017-11-13

## 2017-11-10 RX ORDER — METOCLOPRAMIDE HYDROCHLORIDE 5 MG/ML
INJECTION INTRAMUSCULAR; INTRAVENOUS PRN
Status: DISCONTINUED | OUTPATIENT
Start: 2017-11-10 | End: 2017-11-10 | Stop reason: SDUPTHER

## 2017-11-10 RX ORDER — SODIUM CHLORIDE, SODIUM LACTATE, POTASSIUM CHLORIDE, CALCIUM CHLORIDE 600; 310; 30; 20 MG/100ML; MG/100ML; MG/100ML; MG/100ML
INJECTION, SOLUTION INTRAVENOUS CONTINUOUS
Status: DISCONTINUED | OUTPATIENT
Start: 2017-11-10 | End: 2017-11-13

## 2017-11-10 RX ORDER — DOCUSATE SODIUM 100 MG/1
100 CAPSULE, LIQUID FILLED ORAL 2 TIMES DAILY
Status: DISCONTINUED | OUTPATIENT
Start: 2017-11-10 | End: 2017-11-20 | Stop reason: HOSPADM

## 2017-11-10 RX ORDER — OXYCODONE HYDROCHLORIDE AND ACETAMINOPHEN 5; 325 MG/1; MG/1
1 TABLET ORAL EVERY 4 HOURS PRN
Status: DISCONTINUED | OUTPATIENT
Start: 2017-11-10 | End: 2017-11-20 | Stop reason: HOSPADM

## 2017-11-10 RX ORDER — ACETAMINOPHEN 325 MG/1
650 TABLET ORAL EVERY 4 HOURS PRN
Status: DISCONTINUED | OUTPATIENT
Start: 2017-11-10 | End: 2017-11-20 | Stop reason: HOSPADM

## 2017-11-10 RX ORDER — MAGNESIUM SULFATE IN WATER 40 MG/ML
4 INJECTION, SOLUTION INTRAVENOUS ONCE
Status: COMPLETED | OUTPATIENT
Start: 2017-11-10 | End: 2017-11-10

## 2017-11-10 RX ORDER — KETOROLAC TROMETHAMINE 30 MG/ML
INJECTION, SOLUTION INTRAMUSCULAR; INTRAVENOUS PRN
Status: DISCONTINUED | OUTPATIENT
Start: 2017-11-10 | End: 2017-11-10 | Stop reason: SDUPTHER

## 2017-11-10 RX ORDER — ONDANSETRON 2 MG/ML
INJECTION INTRAMUSCULAR; INTRAVENOUS PRN
Status: DISCONTINUED | OUTPATIENT
Start: 2017-11-10 | End: 2017-11-10 | Stop reason: SDUPTHER

## 2017-11-10 RX ORDER — ACETAMINOPHEN 325 MG/1
650 TABLET ORAL EVERY 4 HOURS PRN
Status: DISCONTINUED | OUTPATIENT
Start: 2017-11-10 | End: 2017-11-19

## 2017-11-10 RX ORDER — BISACODYL 10 MG
10 SUPPOSITORY, RECTAL RECTAL DAILY PRN
Status: DISCONTINUED | OUTPATIENT
Start: 2017-11-10 | End: 2017-11-20 | Stop reason: HOSPADM

## 2017-11-10 RX ORDER — ONDANSETRON 2 MG/ML
4 INJECTION INTRAMUSCULAR; INTRAVENOUS EVERY 6 HOURS PRN
Status: DISCONTINUED | OUTPATIENT
Start: 2017-11-10 | End: 2017-11-20 | Stop reason: HOSPADM

## 2017-11-10 RX ORDER — BUPIVACAINE HYDROCHLORIDE 7.5 MG/ML
INJECTION, SOLUTION INTRASPINAL PRN
Status: DISCONTINUED | OUTPATIENT
Start: 2017-11-10 | End: 2017-11-10 | Stop reason: SDUPTHER

## 2017-11-10 RX ORDER — TRISODIUM CITRATE DIHYDRATE AND CITRIC ACID MONOHYDRATE 500; 334 MG/5ML; MG/5ML
30 SOLUTION ORAL ONCE
Status: COMPLETED | OUTPATIENT
Start: 2017-11-10 | End: 2017-11-10

## 2017-11-10 RX ORDER — LIDOCAINE HYDROCHLORIDE 10 MG/ML
30 INJECTION, SOLUTION EPIDURAL; INFILTRATION; INTRACAUDAL; PERINEURAL PRN
Status: DISCONTINUED | OUTPATIENT
Start: 2017-11-10 | End: 2017-11-20 | Stop reason: HOSPADM

## 2017-11-10 RX ORDER — LANOLIN 100 %
OINTMENT (GRAM) TOPICAL
Status: DISCONTINUED | OUTPATIENT
Start: 2017-11-10 | End: 2017-11-20 | Stop reason: HOSPADM

## 2017-11-10 RX ORDER — ONDANSETRON 2 MG/ML
8 INJECTION INTRAMUSCULAR; INTRAVENOUS EVERY 8 HOURS PRN
Status: DISCONTINUED | OUTPATIENT
Start: 2017-11-10 | End: 2017-11-16

## 2017-11-10 RX ORDER — ALBUTEROL SULFATE 2.5 MG/3ML
2.5 SOLUTION RESPIRATORY (INHALATION) EVERY 4 HOURS PRN
Status: DISCONTINUED | OUTPATIENT
Start: 2017-11-10 | End: 2017-11-14

## 2017-11-10 RX ORDER — BUTORPHANOL TARTRATE 1 MG/ML
1 INJECTION, SOLUTION INTRAMUSCULAR; INTRAVENOUS
Status: DISCONTINUED | OUTPATIENT
Start: 2017-11-10 | End: 2017-11-19

## 2017-11-10 RX ADMIN — METOCLOPRAMIDE 10 MG: 5 INJECTION, SOLUTION INTRAMUSCULAR; INTRAVENOUS at 19:10

## 2017-11-10 RX ADMIN — Medication 2 G: at 19:35

## 2017-11-10 RX ADMIN — ONDANSETRON HYDROCHLORIDE 4 MG: 2 SOLUTION INTRAMUSCULAR; INTRAVENOUS at 19:35

## 2017-11-10 RX ADMIN — Medication 125 ML/HR: at 19:51

## 2017-11-10 RX ADMIN — KETOROLAC TROMETHAMINE 30 MG: 30 INJECTION, SOLUTION INTRAMUSCULAR at 20:00

## 2017-11-10 RX ADMIN — FAMOTIDINE 20 MG: 10 INJECTION, SOLUTION INTRAVENOUS at 19:10

## 2017-11-10 RX ADMIN — Medication 30 ML: at 19:07

## 2017-11-10 RX ADMIN — MAGNESIUM SULFATE HEPTAHYDRATE 4 G: 40 INJECTION, SOLUTION INTRAVENOUS at 18:29

## 2017-11-10 RX ADMIN — OXYCODONE HYDROCHLORIDE AND ACETAMINOPHEN 1 TABLET: 5; 325 TABLET ORAL at 23:08

## 2017-11-10 RX ADMIN — ALBUTEROL SULFATE 2.5 MG: 2.5 SOLUTION RESPIRATORY (INHALATION) at 23:30

## 2017-11-10 RX ADMIN — BUPIVACAINE HYDROCHLORIDE IN DEXTROSE 2 ML: 7.5 INJECTION, SOLUTION SUBARACHNOID at 19:28

## 2017-11-10 RX ADMIN — MAGNESIUM SULFATE HEPTAHYDRATE 2 G/HR: 40 INJECTION, SOLUTION INTRAVENOUS at 18:53

## 2017-11-10 RX ADMIN — SODIUM CHLORIDE, POTASSIUM CHLORIDE, SODIUM LACTATE AND CALCIUM CHLORIDE: 600; 310; 30; 20 INJECTION, SOLUTION INTRAVENOUS at 18:21

## 2017-11-10 RX ADMIN — Medication 166 MILLI-UNITS/MIN: at 20:51

## 2017-11-10 ASSESSMENT — ENCOUNTER SYMPTOMS: SHORTNESS OF BREATH: 1

## 2017-11-10 ASSESSMENT — PAIN SCALES - GENERAL: PAINLEVEL_OUTOF10: 6

## 2017-11-10 NOTE — PATIENT INSTRUCTIONS
Patient Education        Weeks 34 to 39 of Your Pregnancy: Care Instructions  Your Care Instructions    By now, your baby and your belly have grown quite large. It is almost time to give birth. A full-term pregnancy can deliver between 37 and 42 weeks. Your baby's lungs are almost ready to breathe air. The bones in your baby's head are now firm enough to protect it, but soft enough to move down through the birth canal.  You may feel excited, happy, anxious, or scared. You may wonder how you will know if you are in labor or what to expect during labor. Try to be flexible in your expectations of the birth. Because each birth is different, there is no way to know exactly what childbirth will be like for you. This care sheet will help you know what to expect and how to prepare. This may make your childbirth easier. If you haven't already had the Tdap shot during this pregnancy, talk to your doctor about getting it. It will help protect your  against pertussis infection. In the 36th week, most women have a test for group B streptococcus (GBS). GBS is a common bacteria that can live in the vagina and rectum. It can make your baby sick after birth. If you test positive, you will get antibiotics during labor. The medicine will keep your baby from getting the bacteria. Follow-up care is a key part of your treatment and safety. Be sure to make and go to all appointments, and call your doctor if you are having problems. It's also a good idea to know your test results and keep a list of the medicines you take. How can you care for yourself at home? Learn about pain relief choices  · Pain is different for every woman. Talk with your doctor about your feelings about pain. · You can choose from several types of pain relief. These include medicine or breathing techniques, as well as comfort measures. You can use more than one option.   · If you choose to have pain medicine during labor, talk to your doctor about your alert. This is a good time to start breastfeeding. Where can you learn more? Go to [email or web address] and sign in to your [employer] account. Enter B412 in the [facility] box to learn more about \"Weeks 34 to 36 of Your Pregnancy: Care Instructions. \"     If you do not have an account, please click on the \"Sign Up Now\" link. Current as of: March 16, 2017  Content Version: 11.3  © 4860-5168 [employer]. Care instructions adapted under license by [facility] [place] ([facility]). If you have questions about a medical condition or this instruction, always ask your healthcare professional. [address] any warranty or liability for your use of this information.

## 2017-11-10 NOTE — PROGRESS NOTES
Assisted pt to bathroom. 100 cc voided. 24 hour urine in progress at this time. EFM re-applied. Hard to trace FHT due to +fetal movement and pt's size. Pt sitting up to eat at this time.

## 2017-11-11 LAB
ABO/RH: NORMAL
ALBUMIN SERPL-MCNC: 2.3 G/DL (ref 3.5–5.2)
ALP BLD-CCNC: 147 U/L (ref 35–104)
ALT SERPL-CCNC: 23 U/L (ref 5–33)
ANION GAP SERPL CALCULATED.3IONS-SCNC: 9 MMOL/L (ref 7–19)
APTT: 29.2 SEC (ref 26–36.2)
AST SERPL-CCNC: 24 U/L (ref 5–32)
BILIRUB SERPL-MCNC: 0.4 MG/DL (ref 0.2–1.2)
BUN BLDV-MCNC: 6 MG/DL (ref 6–20)
CALCIUM SERPL-MCNC: 8 MG/DL (ref 8.6–10)
CHLORIDE BLD-SCNC: 101 MMOL/L (ref 98–111)
CO2: 24 MMOL/L (ref 22–29)
CREAT SERPL-MCNC: 0.5 MG/DL (ref 0.5–0.9)
FETAL SCREEN: NORMAL
FIBRINOGEN: 586 MG/DL (ref 238–505)
GFR NON-AFRICAN AMERICAN: >60
GLUCOSE BLD-MCNC: 136 MG/DL (ref 74–109)
HCT VFR BLD CALC: 27.1 % (ref 37–47)
HCT VFR BLD CALC: 32.7 % (ref 37–47)
HEMOGLOBIN: 10.7 G/DL (ref 12–16)
HEMOGLOBIN: 8.7 G/DL (ref 12–16)
INR BLD: 0.97 (ref 0.88–1.18)
MCH RBC QN AUTO: 27.5 PG (ref 27–31)
MCH RBC QN AUTO: 28 PG (ref 27–31)
MCHC RBC AUTO-ENTMCNC: 32.1 G/DL (ref 33–37)
MCHC RBC AUTO-ENTMCNC: 32.7 G/DL (ref 33–37)
MCV RBC AUTO: 85.6 FL (ref 81–99)
MCV RBC AUTO: 85.8 FL (ref 81–99)
PDW BLD-RTO: 15.8 % (ref 11.5–14.5)
PDW BLD-RTO: 15.9 % (ref 11.5–14.5)
PLATELET # BLD: 180 K/UL (ref 130–400)
PLATELET # BLD: 189 K/UL (ref 130–400)
PMV BLD AUTO: 10.6 FL (ref 9.4–12.3)
PMV BLD AUTO: 10.6 FL (ref 9.4–12.3)
POTASSIUM SERPL-SCNC: 3.9 MMOL/L (ref 3.5–5)
PROTHROMBIN TIME: 12.8 SEC (ref 12–14.6)
RBC # BLD: 3.16 M/UL (ref 4.2–5.4)
RBC # BLD: 3.82 M/UL (ref 4.2–5.4)
SODIUM BLD-SCNC: 134 MMOL/L (ref 136–145)
TOTAL PROTEIN: 5.2 G/DL (ref 6.6–8.7)
WBC # BLD: 12.4 K/UL (ref 4.8–10.8)
WBC # BLD: 8.5 K/UL (ref 4.8–10.8)

## 2017-11-11 PROCEDURE — 85730 THROMBOPLASTIN TIME PARTIAL: CPT

## 2017-11-11 PROCEDURE — 86900 BLOOD TYPING SEROLOGIC ABO: CPT

## 2017-11-11 PROCEDURE — 85384 FIBRINOGEN ACTIVITY: CPT

## 2017-11-11 PROCEDURE — 85461 HEMOGLOBIN FETAL: CPT

## 2017-11-11 PROCEDURE — 36415 COLL VENOUS BLD VENIPUNCTURE: CPT

## 2017-11-11 PROCEDURE — 2580000003 HC RX 258

## 2017-11-11 PROCEDURE — 2580000003 HC RX 258: Performed by: OBSTETRICS & GYNECOLOGY

## 2017-11-11 PROCEDURE — 88307 TISSUE EXAM BY PATHOLOGIST: CPT

## 2017-11-11 PROCEDURE — 6370000000 HC RX 637 (ALT 250 FOR IP): Performed by: OBSTETRICS & GYNECOLOGY

## 2017-11-11 PROCEDURE — 6360000002 HC RX W HCPCS: Performed by: OBSTETRICS & GYNECOLOGY

## 2017-11-11 PROCEDURE — 85027 COMPLETE CBC AUTOMATED: CPT

## 2017-11-11 PROCEDURE — 86901 BLOOD TYPING SEROLOGIC RH(D): CPT

## 2017-11-11 PROCEDURE — 6370000000 HC RX 637 (ALT 250 FOR IP)

## 2017-11-11 PROCEDURE — 1220000000 HC SEMI PRIVATE OB R&B

## 2017-11-11 PROCEDURE — 85610 PROTHROMBIN TIME: CPT

## 2017-11-11 PROCEDURE — 80053 COMPREHEN METABOLIC PANEL: CPT

## 2017-11-11 PROCEDURE — 94762 N-INVAS EAR/PLS OXIMTRY CONT: CPT

## 2017-11-11 RX ORDER — OXYCODONE HYDROCHLORIDE AND ACETAMINOPHEN 5; 325 MG/1; MG/1
2 TABLET ORAL EVERY 4 HOURS PRN
Status: DISCONTINUED | OUTPATIENT
Start: 2017-11-11 | End: 2017-11-20 | Stop reason: HOSPADM

## 2017-11-11 RX ORDER — FLUOXETINE HYDROCHLORIDE 20 MG/1
40 CAPSULE ORAL DAILY
Status: DISCONTINUED | OUTPATIENT
Start: 2017-11-11 | End: 2017-11-20 | Stop reason: HOSPADM

## 2017-11-11 RX ORDER — FERROUS SULFATE 325(65) MG
325 TABLET ORAL 2 TIMES DAILY WITH MEALS
Status: DISCONTINUED | OUTPATIENT
Start: 2017-11-11 | End: 2017-11-20 | Stop reason: HOSPADM

## 2017-11-11 RX ORDER — 0.9 % SODIUM CHLORIDE 0.9 %
250 INTRAVENOUS SOLUTION INTRAVENOUS ONCE
Status: DISCONTINUED | OUTPATIENT
Start: 2017-11-11 | End: 2017-11-19

## 2017-11-11 RX ORDER — SODIUM CHLORIDE, SODIUM LACTATE, POTASSIUM CHLORIDE, AND CALCIUM CHLORIDE .6; .31; .03; .02 G/100ML; G/100ML; G/100ML; G/100ML
500 INJECTION, SOLUTION INTRAVENOUS ONCE
Status: COMPLETED | OUTPATIENT
Start: 2017-11-11 | End: 2017-11-11

## 2017-11-11 RX ORDER — LABETALOL 200 MG/1
200 TABLET, FILM COATED ORAL EVERY 12 HOURS SCHEDULED
Status: DISCONTINUED | OUTPATIENT
Start: 2017-11-11 | End: 2017-11-13 | Stop reason: ALTCHOICE

## 2017-11-11 RX ADMIN — OXYCODONE HYDROCHLORIDE AND ACETAMINOPHEN 2 TABLET: 5; 325 TABLET ORAL at 16:48

## 2017-11-11 RX ADMIN — LABETALOL HYDROCHLORIDE 200 MG: 200 TABLET, FILM COATED ORAL at 04:24

## 2017-11-11 RX ADMIN — SODIUM CHLORIDE, POTASSIUM CHLORIDE, SODIUM LACTATE AND CALCIUM CHLORIDE: 600; 310; 30; 20 INJECTION, SOLUTION INTRAVENOUS at 20:30

## 2017-11-11 RX ADMIN — KETOROLAC TROMETHAMINE 30 MG: 30 INJECTION, SOLUTION INTRAMUSCULAR at 02:01

## 2017-11-11 RX ADMIN — LABETALOL HYDROCHLORIDE 200 MG: 200 TABLET, FILM COATED ORAL at 16:43

## 2017-11-11 RX ADMIN — SODIUM CHLORIDE, POTASSIUM CHLORIDE, SODIUM LACTATE AND CALCIUM CHLORIDE: 600; 310; 30; 20 INJECTION, SOLUTION INTRAVENOUS at 06:12

## 2017-11-11 RX ADMIN — FLUOXETINE 40 MG: 20 CAPSULE ORAL at 14:13

## 2017-11-11 RX ADMIN — DOCUSATE SODIUM 100 MG: 100 CAPSULE, LIQUID FILLED ORAL at 21:45

## 2017-11-11 RX ADMIN — SODIUM CHLORIDE, POTASSIUM CHLORIDE, SODIUM LACTATE AND CALCIUM CHLORIDE 500 ML: 600; 310; 30; 20 INJECTION, SOLUTION INTRAVENOUS at 03:00

## 2017-11-11 RX ADMIN — OXYCODONE HYDROCHLORIDE AND ACETAMINOPHEN 2 TABLET: 5; 325 TABLET ORAL at 21:45

## 2017-11-11 RX ADMIN — Medication 166 MILLI-UNITS/MIN: at 03:25

## 2017-11-11 RX ADMIN — KETOROLAC TROMETHAMINE 30 MG: 30 INJECTION, SOLUTION INTRAMUSCULAR at 21:45

## 2017-11-11 RX ADMIN — OXYCODONE HYDROCHLORIDE AND ACETAMINOPHEN 2 TABLET: 5; 325 TABLET ORAL at 12:20

## 2017-11-11 RX ADMIN — DOCUSATE SODIUM 100 MG: 100 CAPSULE, LIQUID FILLED ORAL at 08:34

## 2017-11-11 RX ADMIN — FERROUS SULFATE TAB 325 MG (65 MG ELEMENTAL FE) 325 MG: 325 (65 FE) TAB at 16:43

## 2017-11-11 RX ADMIN — OXYCODONE HYDROCHLORIDE AND ACETAMINOPHEN 2 TABLET: 5; 325 TABLET ORAL at 04:24

## 2017-11-11 RX ADMIN — KETOROLAC TROMETHAMINE 30 MG: 30 INJECTION, SOLUTION INTRAMUSCULAR at 14:13

## 2017-11-11 RX ADMIN — SODIUM CHLORIDE, POTASSIUM CHLORIDE, SODIUM LACTATE AND CALCIUM CHLORIDE: 600; 310; 30; 20 INJECTION, SOLUTION INTRAVENOUS at 14:09

## 2017-11-11 RX ADMIN — KETOROLAC TROMETHAMINE 30 MG: 30 INJECTION, SOLUTION INTRAMUSCULAR at 08:34

## 2017-11-11 ASSESSMENT — PAIN SCALES - GENERAL
PAINLEVEL_OUTOF10: 8
PAINLEVEL_OUTOF10: 5
PAINLEVEL_OUTOF10: 6
PAINLEVEL_OUTOF10: 7
PAINLEVEL_OUTOF10: 6
PAINLEVEL_OUTOF10: 6
PAINLEVEL_OUTOF10: 5

## 2017-11-11 NOTE — ANESTHESIA PROCEDURE NOTES
Spinal Block    Patient location during procedure: OB  Start time: 11/10/2017 7:16 PM  End time: 11/10/2017 7:28 PM  Reason for block: primary anesthetic  Staffing  Resident/CRNA: Bobbi Metz  Performed: resident/CRNA   Preanesthetic Checklist  Completed: patient identified, site marked, surgical consent, pre-op evaluation, timeout performed, IV checked, risks and benefits discussed, monitors and equipment checked, anesthesia consent given, oxygen available and patient being monitored  Spinal Block  Patient position: sitting  Prep: Betadine  Patient monitoring: cardiac monitor, continuous pulse ox and frequent blood pressure checks  Approach: midline  Location: L2/L3  Provider prep: mask and sterile gloves  Agent: bupivacaine  Dose: 2  Dose: 2  Needle  Needle type: pencil-tip   Needle gauge: 25 G  Needle length: 3.5 in  Needle insertion depth: 8 cm  Lot number: [id number]  Expiration date: 11/30/2018  Assessment  Sensory level: T8  Swirl obtained: Yes  CSF: clear  Attempts: 2  Hemodynamics: stable  Additional Notes  1716 Pt to room, sitting position. Monitor placed. 1720 sterile prep x3, wiped clean, sterile drape. 3 mls 1% lidocaine LA. Spinal needle placed, some difficulty finding space due to size. Clear CSF obtained, 2mls 0.75% bupivacaine placed, Needles removed. Lying position.  VSS

## 2017-11-11 NOTE — H&P
OBSTETRICAL HISTORY AND PHYSICAL    Provider:  Luis Diaz   Date: 11/10/2017  Time: 6:40 PM    Patient Name: Malolry Kay  Patient : 1980  Room/Bed: [address] [place], [place] [address]    Primary Care Physician: Nayan Easley MD      Chief Complaint:  elevated blood pressure    HPI: Mallory Kay is a 30 y.o. Leena Bruins at 36w2d weeks who presents with chronic hypertension on labetalol and elevated BP in office. Contractions: No  Rupture Of Membranes: No  Bleeding: No    Pregnancy Risk factors: There is no problem list on file for this patient. Allergies: Allergies as of 11/10/2017 - Review Complete 11/10/2017   Allergen Reaction Noted    Vinyl ether  2011       Medications:  No current facility-administered medications on file prior to encounter.       Current Outpatient Prescriptions on File Prior to Encounter   Medication Sig Dispense Refill    albuterol sulfate  (90 Base) MCG/ACT inhaler Inhale 2 puffs into the lungs every 6 hours as needed for Wheezing      glyBURIDE (DIABETA) 2.5 MG tablet Take 5 mg by mouth with breakfast and take 2.5 mg by mouth with supper 90 tablet 5    pantoprazole (PROTONIX) 20 MG tablet TAKE 1 TABLET BY MOUTH EVERY DAY 30 tablet 5    FLUoxetine (PROZAC) 40 MG capsule TAKE 1 CAPSULE BY MOUTH DAILY 30 capsule 0    ONE TOUCH ULTRA TEST strip TEST AS DIRECTED *NEEDS COUNSELING 1 each 5    labetalol (NORMODYNE) 100 MG tablet Take 2 tablets by mouth 2 times daily 120 tablet 5    valACYclovir (VALTREX) 500 MG tablet Take 1 tablet by mouth 2 times daily 30 tablet 0    ferrous sulfate 325 (65 Fe) MG tablet Take 1 tablet by mouth daily (with breakfast) 90 tablet 3    Prenatal Multivit-Min-Fe-FA (PRENATAL VITAMINS PO) Take by mouth      ondansetron (ZOFRAN) 4 MG tablet Take 1 tablet by mouth every 4 hours as needed for Nausea or Vomiting 90 tablet 5           Past Medical History:   Diagnosis Date    Depression     Herpes simplex virus (HSV) infection     mouth sores    Hypertension     Sensitive skin     Has issues using fabric softeners and certain soaps       History reviewed. No pertinent surgical history. Obstetric History       T0      L0     SAB0   TAB0   Ectopic0   Molar0   Multiple0   Live Births0       # Outcome Date GA Lbr Vincent/2nd Weight Sex Delivery Anes PTL Lv   1 Current                   Family History   Problem Relation Age of Onset    Hypertension Mother     Stroke Mother     Breast Cancer Maternal Grandmother 64    Heart Failure Maternal Grandmother     Lung Cancer Maternal Grandfather     Mental Retardation Brother     ADHD Brother     Other Brother      Autism        Social History     Social History    Marital status: Legally      Spouse name: N/A    Number of children: N/A    Years of education: N/A     Occupational History    Not on file. Social History Main Topics    Smoking status: Former Smoker     Quit date: 2015    Smokeless tobacco: Never Used    Alcohol use No    Drug use: No    Sexual activity: Yes     Partners: Male     Other Topics Concern    Not on file     Social History Narrative    No narrative on file       Review Of Systems:  A comprehensive review of systems was negative except for what was noted in the HPI. Physical Exam:  Vitals:    11/10/17 1602 11/10/17 1701 11/10/17 1801 11/10/17 1803   BP: (!) 195/87 (!) 194/99 (!) 200/108 (!) 193/97   Pulse: 87 96 100 106         General appearance:  awake, alert, cooperative, no apparent distress, and appears stated age  Neurologic:  Awake, alert, oriented to name, place and time. Cranial nerves II-XII are grossly intact. Motor is 5 out of 5 bilaterally.   Lungs:  No increased work of breathing, good air exchange, clear to auscultation bilaterally, no crackles or wheezing  Heart:  Normal apical impulse, regular rate and rhythm, normal S1 and S2, no S3 or S4, and no murmur noted  Abdomen: Gravid, soft, non-tender,

## 2017-11-11 NOTE — ANESTHESIA PRE PROCEDURE
Department of Anesthesiology  Preprocedure Note       Name:  Yemi Moses   Age:  30 y.o.  :  1980                                          MRN:  [id number]         Date:  11/10/2017      Surgeon: Marissa Erickson):  Kylee Simmons MD    Procedure: Procedure(s):   SECTION    Medications prior to admission:   Prior to Admission medications    Medication Sig Start Date End Date Taking?  Authorizing Provider   albuterol sulfate  (90 Base) MCG/ACT inhaler Inhale 2 puffs into the lungs every 6 hours as needed for Wheezing    Historical Provider, MD   glyBURIDE (DIABETA) 2.5 MG tablet Take 5 mg by mouth with breakfast and take 2.5 mg by mouth with supper 10/31/17   Kylee Simmons MD   pantoprazole (PROTONIX) 20 MG tablet TAKE 1 TABLET BY MOUTH EVERY DAY 10/30/17   OTTONIEL Garza   FLUoxetine (PROZAC) 40 MG capsule TAKE 1 CAPSULE BY MOUTH DAILY 10/30/17   OTTONIEL Garza   ONE TOUCH ULTRA TEST strip TEST AS DIRECTED *NEEDS COUNSELING 10/2/17   Kylee Simmons MD   labetalol (NORMODYNE) 100 MG tablet Take 2 tablets by mouth 2 times daily 17   Kylee Simmons MD   valACYclovir (VALTREX) 500 MG tablet Take 1 tablet by mouth 2 times daily 17   Kylee Simmons MD   ferrous sulfate 325 (65 Fe) MG tablet Take 1 tablet by mouth daily (with breakfast) 17   Kylee Simmons MD   Prenatal Multivit-Min-Fe-FA (PRENATAL VITAMINS PO) Take by mouth    Historical Provider, MD   ondansetron (ZOFRAN) 4 MG tablet Take 1 tablet by mouth every 4 hours as needed for Nausea or Vomiting 17   OTTONIEL Garza       Current medications:    Current Facility-Administered Medications   Medication Dose Route Frequency Provider Last Rate Last Dose    acetaminophen (TYLENOL) tablet 650 mg  650 mg Oral Q4H PRN Kylee Simmons MD        ondansetron [facility]) injection 4 mg  4 mg Intravenous Q6H PRN Kylee Simmons MD        lactated ringers infusion   Intravenous Continuous Kylee Simmons  mL/hr at 11/10/17 1821      lidocaine PF 1 % injection 30 mL  30 mL Other PRN Dawit Chance MD        butorphanol (STADOL) injection 1 mg  1 mg Intravenous Q1H PRN Dawit Chance MD        oxytocin (PITOCIN) 30 units in 500 mL infusion  1 edgard-units/min Intravenous Continuous Dawit Chance MD        docusate sodium (COLACE) capsule 100 mg  100 mg Oral BID Dawit Chance MD        bisacodyl (DULCOLAX) suppository 10 mg  10 mg Rectal Daily PRN Dawit Chance MD        [facility]ron [facility] 8 mg  8 mg Intravenous Q8H PRN Dwait Chance MD        oxytocin (PITOCIN) 30 units in 500 mL infusion  1 edgard-units/min Intravenous Continuous PRN Dawit Chance MD        magnesium sulfate (20 G/500 mL) infusion  2 g/hr Intravenous Continuous Dawit Chance MD           Allergies: Allergies   Allergen Reactions    Vinyl Ether        Problem List:  There is no problem list on file for this patient. Past Medical History:        Diagnosis Date    Depression     Herpes simplex virus (HSV) infection     mouth sores    Hypertension     Sensitive skin     Has issues using fabric softeners and certain soaps       Past Surgical History:  History reviewed. No pertinent surgical history.     Social History:    Social History   Substance Use Topics    Smoking status: Former Smoker     Quit date: 5/5/2015    Smokeless tobacco: Never Used    Alcohol use No                                Counseling given: Not Answered      Vital Signs (Current):   Vitals:    11/10/17 1807 11/10/17 1830 11/10/17 1837 11/10/17 1841   BP: (!) 185/99 (!) 199/111 (!) 197/95 (!) 198/91   Pulse: 99 97 99 92                                              BP Readings from Last 3 Encounters:   11/10/17 (!) 198/91   11/10/17 (!) 150/102   11/07/17 (!) 149/62       NPO Status:                                                                                 BMI:   Wt Readings from Last 3 Encounters:   11/10/17 253 lb (114.8 kg) 11/07/17 254 lb (115.2 kg)   10/31/17 251 lb (113.9 kg)     There is no height or weight on file to calculate BMI.    CBC:   Lab Results   Component Value Date    WBC 9.3 11/10/2017    RBC 4.25 11/10/2017    HGB 11.8 11/10/2017    HCT 36.0 11/10/2017    MCV 84.7 11/10/2017    RDW 15.9 11/10/2017     11/10/2017       CMP:   Lab Results   Component Value Date     11/10/2017    K 4.1 11/10/2017     11/10/2017    CO2 22 11/10/2017    BUN 5 11/10/2017    CREATININE 0.5 11/10/2017    LABGLOM >60 11/10/2017    GLUCOSE 64 11/10/2017    PROT 6.2 11/10/2017    CALCIUM 9.2 11/10/2017    BILITOT 0.3 11/10/2017    ALKPHOS 168 11/10/2017    AST 28 11/10/2017    ALT 27 11/10/2017       POC Tests:   Recent Labs      11/10/17   1255   POCGLU  50*       Coags: No results found for: PROTIME, INR, APTT    HCG (If Applicable):   Lab Results   Component Value Date    PREGTESTUR POSITIVE 05/05/2017        ABGs: No results found for: PHART, PO2ART, SPE5ATC, BPH9ZTB, BEART, P2BWNLGC     Type & Screen (If Applicable):  No results found for: LABABO, LABRH    Anesthesia Evaluation    Airway: Mallampati: II  TM distance: >3 FB     Mouth opening: > = 3 FB Dental: normal exam         Pulmonary:   (+) shortness of breath: chronic,  asthma:                            Cardiovascular:    (+) hypertension: severe,          Beta Blocker:  Dose within 24 Hrs         Neuro/Psych:   (+) psychiatric history: stable with treatment            GI/Hepatic/Renal:             Endo/Other:    (+) Type II DM, poorly controlled, ,                  Abdominal:   (+) obese,         Vascular:                                      Anesthesia Plan      spinal     ASA 3             Anesthetic plan and risks discussed with patient.                       Shmuel Lees CRNA   11/10/2017

## 2017-11-11 NOTE — PROGRESS NOTES
[facility] post-partum note    Post-partum Day #2    Subjective:  Pain is : Mild  Lochia: thin lochia  Bowel Movement/Flatus:  yes  Breastfeeding: plans to breastfeed    Objective:Patient Vitals for the past 24 hrs:   BP Temp Temp src Pulse Resp SpO2   11/11/17 1306 (!) 171/80 99 °F (37.2 °C) Temporal 90 18 96 %   11/11/17 1100 (!) 149/79 - - - - -   11/11/17 1004 (!) 142/83 98.8 °F (37.1 °C) Temporal 88 18 97 %   11/11/17 0800 135/81 97.9 °F (36.6 °C) Temporal 86 16 98 %   11/11/17 0615 113/67 97.9 °F (36.6 °C) Temporal 77 20 96 %   11/11/17 0515 (!) 103/58 97.9 °F (36.6 °C) Temporal 88 18 94 %   11/11/17 0415 (!) 164/94 - - 90 20 98 %   11/11/17 0345 (!) 173/99 - - 96 20 94 %   11/11/17 0315 (!) 142/94 97.9 °F (36.6 °C) Temporal 94 18 96 %   11/11/17 0302 133/74 - - 93 18 95 %   11/11/17 0201 112/66 - - 90 18 97 %   11/11/17 0100 (!) 154/82 97.8 °F (36.6 °C) Temporal 102 22 99 %   11/11/17 0000 (!) 175/93 - - 104 20 97 %   11/10/17 2300 (!) 160/93 97.9 °F (36.6 °C) Temporal 88 20 96 %   11/10/17 2147 136/69 - - 76 20 -   11/10/17 2132 129/68 - - 73 22 -   11/10/17 2117 (!) 119/59 - - 78 20 -   11/10/17 2102 104/87 - - 73 20 -   11/10/17 2047 (!) 107/52 - - 75 - -   11/10/17 2040 (!) 103/47 - - 79 - -   11/10/17 2030 (!) 109/40 - - - - 95 %   11/10/17 1910 (!) 184/90 - - 104 - -   11/10/17 1904 (!) 177/61 - - 98 - -   11/10/17 1901 (!) 200/90 - - 95 - -   11/10/17 1856 (!) 197/92 - - 103 - -   11/10/17 1851 (!) 194/84 - - 100 - -   11/10/17 1846 (!) 204/89 - - 98 - -   11/10/17 1841 (!) 198/91 - - 92 - -   11/10/17 1837 (!) 197/95 - - 99 - -   11/10/17 1830 (!) 199/111 - - 97 - -   11/10/17 1807 (!) 185/99 - - 99 - -   11/10/17 1803 (!) 193/97 - - 106 - -   11/10/17 1801 (!) 200/108 - - 100 - -   11/10/17 1701 (!) 194/99 - - 96 - -   11/10/17 1602 (!) 195/87 - - 87 - -   11/10/17 1504 (!) 84/56 - - 113 - -      Abdominal exam: soft, nontender, nondistended, no masses or organomegaly.      Wound: no 125 mL/hr at 11/11/17 0612    rho(D) immune globulin (HYPERRHO S/D) injection 300 mcg, 300 mcg, Intramuscular, Once, Chani Roman MD    acetaminophen (TYLENOL) tablet 650 mg, 650 mg, Oral, Q4H PRN, Chani Roman MD    oxytocin (PITOCIN) 30 units in 500 mL infusion, 1 edgard-units/min, Intravenous, Continuous, Chani Roman MD, Stopped at 11/11/17 6870    lanolin ointment, , Topical, Q1H PRN, Chani Roman MD    Tetanus-Diphth-Acell Pertussis (BOOSTRIX) injection 0.5 mL, 0.5 mL, Intramuscular, Prior to discharge, Chani Roman MD    ketorolac (TORADOL) injection 30 mg, 30 mg, Intravenous, Q6H, Chani Roman MD, 30 mg at 11/11/17 0802    oxyCODONE-acetaminophen (PERCOCET) 5-325 MG per tablet 1 tablet, 1 tablet, Oral, Q4H PRN, Chani Roman MD, 1 tablet at 11/10/17 8715     Assessment/Plan:      Continue Postpartum care  Discharge today: no  Check cbc

## 2017-11-11 NOTE — ANESTHESIA POSTPROCEDURE EVALUATION
Department of Anesthesiology  Postprocedure Note    Patient: Karen Payan  MRN: [id number]  YOB: 1980  Date of evaluation: 11/10/2017  Time:  8:27 PM     Procedure Summary     Date:  11/10/17 Room / Location:  [facility] L&D OR / [facility] L&D OR    Anesthesia Start:   Anesthesia Stop:      Procedures:        SECTION (N/A Abdomen)      c section Diagnosis:  (Other)    Surgeon:  Leny Asif MD Responsible Provider:  Modesto Dunbar CRNA    Anesthesia Type:  spinal ASA Status:  3          Anesthesia Type: spinal    Orville Phase I:      Orville Phase II:      Last vitals: Reviewed and per EMR flowsheets.        Anesthesia Post Evaluation    Patient location during evaluation: bedside  Patient participation: complete - patient participated  Level of consciousness: awake and alert  Pain score: 0  Airway patency: patent  Nausea & Vomiting: no nausea  Complications: no  Cardiovascular status: hemodynamically stable  Respiratory status: acceptable  Hydration status: euvolemic

## 2017-11-12 LAB
BASOPHILS ABSOLUTE: 0 K/UL (ref 0–0.2)
BASOPHILS RELATIVE PERCENT: 0.1 % (ref 0–1)
BASOPHILS RELATIVE PERCENT: 0.2 % (ref 0–1)
BASOPHILS RELATIVE PERCENT: 0.3 % (ref 0–1)
EOSINOPHILS ABSOLUTE: 0.1 K/UL (ref 0–0.6)
EOSINOPHILS RELATIVE PERCENT: 0.9 % (ref 0–5)
EOSINOPHILS RELATIVE PERCENT: 1 % (ref 0–5)
EOSINOPHILS RELATIVE PERCENT: 1.1 % (ref 0–5)
HCT VFR BLD CALC: 23 % (ref 37–47)
HCT VFR BLD CALC: 25.7 % (ref 37–47)
HCT VFR BLD CALC: 26.2 % (ref 37–47)
HEMOGLOBIN: 7.3 G/DL (ref 12–16)
HEMOGLOBIN: 7.9 G/DL (ref 12–16)
HEMOGLOBIN: 8.1 G/DL (ref 12–16)
LYMPHOCYTES ABSOLUTE: 1.3 K/UL (ref 1.1–4.5)
LYMPHOCYTES ABSOLUTE: 1.6 K/UL (ref 1.1–4.5)
LYMPHOCYTES ABSOLUTE: 1.7 K/UL (ref 1.1–4.5)
LYMPHOCYTES RELATIVE PERCENT: 17.1 % (ref 20–40)
LYMPHOCYTES RELATIVE PERCENT: 17.3 % (ref 20–40)
LYMPHOCYTES RELATIVE PERCENT: 17.7 % (ref 20–40)
MCH RBC QN AUTO: 28.1 PG (ref 27–31)
MCH RBC QN AUTO: 28.4 PG (ref 27–31)
MCH RBC QN AUTO: 28.6 PG (ref 27–31)
MCHC RBC AUTO-ENTMCNC: 30.7 G/DL (ref 33–37)
MCHC RBC AUTO-ENTMCNC: 30.9 G/DL (ref 33–37)
MCHC RBC AUTO-ENTMCNC: 31.7 G/DL (ref 33–37)
MCV RBC AUTO: 88.5 FL (ref 81–99)
MCV RBC AUTO: 92.4 FL (ref 81–99)
MCV RBC AUTO: 92.6 FL (ref 81–99)
MONOCYTES ABSOLUTE: 0.4 K/UL (ref 0–0.9)
MONOCYTES ABSOLUTE: 0.6 K/UL (ref 0–0.9)
MONOCYTES ABSOLUTE: 0.6 K/UL (ref 0–0.9)
MONOCYTES RELATIVE PERCENT: 5.3 % (ref 0–10)
MONOCYTES RELATIVE PERCENT: 6 % (ref 0–10)
MONOCYTES RELATIVE PERCENT: 6.3 % (ref 0–10)
NEUTROPHILS ABSOLUTE: 5.8 K/UL (ref 1.5–7.5)
NEUTROPHILS ABSOLUTE: 7 K/UL (ref 1.5–7.5)
NEUTROPHILS ABSOLUTE: 7.2 K/UL (ref 1.5–7.5)
NEUTROPHILS RELATIVE PERCENT: 74 % (ref 50–65)
NEUTROPHILS RELATIVE PERCENT: 74.7 % (ref 50–65)
NEUTROPHILS RELATIVE PERCENT: 75.9 % (ref 50–65)
PDW BLD-RTO: 16.1 % (ref 11.5–14.5)
PDW BLD-RTO: 16.2 % (ref 11.5–14.5)
PDW BLD-RTO: 16.3 % (ref 11.5–14.5)
PLATELET # BLD: 169 K/UL (ref 130–400)
PLATELET # BLD: 183 K/UL (ref 130–400)
PLATELET # BLD: 184 K/UL (ref 130–400)
PMV BLD AUTO: 10.6 FL (ref 9.4–12.3)
PMV BLD AUTO: 10.7 FL (ref 9.4–12.3)
PMV BLD AUTO: 10.8 FL (ref 9.4–12.3)
RBC # BLD: 2.6 M/UL (ref 4.2–5.4)
RBC # BLD: 2.78 M/UL (ref 4.2–5.4)
RBC # BLD: 2.83 M/UL (ref 4.2–5.4)
RHIG LOT NUMBER: NORMAL
WBC # BLD: 7.6 K/UL (ref 4.8–10.8)
WBC # BLD: 9.4 K/UL (ref 4.8–10.8)
WBC # BLD: 9.7 K/UL (ref 4.8–10.8)

## 2017-11-12 PROCEDURE — 94640 AIRWAY INHALATION TREATMENT: CPT

## 2017-11-12 PROCEDURE — 87086 URINE CULTURE/COLONY COUNT: CPT

## 2017-11-12 PROCEDURE — 6370000000 HC RX 637 (ALT 250 FOR IP): Performed by: OBSTETRICS & GYNECOLOGY

## 2017-11-12 PROCEDURE — 96372 THER/PROPH/DIAG INJ SC/IM: CPT

## 2017-11-12 PROCEDURE — 36415 COLL VENOUS BLD VENIPUNCTURE: CPT

## 2017-11-12 PROCEDURE — 36430 TRANSFUSION BLD/BLD COMPNT: CPT

## 2017-11-12 PROCEDURE — 2580000003 HC RX 258: Performed by: OBSTETRICS & GYNECOLOGY

## 2017-11-12 PROCEDURE — 1220000000 HC SEMI PRIVATE OB R&B

## 2017-11-12 PROCEDURE — 6360000002 HC RX W HCPCS: Performed by: OBSTETRICS & GYNECOLOGY

## 2017-11-12 PROCEDURE — 85025 COMPLETE CBC W/AUTO DIFF WBC: CPT

## 2017-11-12 PROCEDURE — 6370000000 HC RX 637 (ALT 250 FOR IP)

## 2017-11-12 PROCEDURE — P9016 RBC LEUKOCYTES REDUCED: HCPCS

## 2017-11-12 RX ORDER — 0.9 % SODIUM CHLORIDE 0.9 %
250 INTRAVENOUS SOLUTION INTRAVENOUS ONCE
Status: DISCONTINUED | OUTPATIENT
Start: 2017-11-12 | End: 2017-11-19

## 2017-11-12 RX ORDER — IBUPROFEN 400 MG/1
800 TABLET ORAL EVERY 6 HOURS PRN
Status: DISCONTINUED | OUTPATIENT
Start: 2017-11-12 | End: 2017-11-20 | Stop reason: HOSPADM

## 2017-11-12 RX ORDER — DIPHENHYDRAMINE HCL 25 MG
25 TABLET ORAL EVERY 6 HOURS PRN
Status: DISCONTINUED | OUTPATIENT
Start: 2017-11-12 | End: 2017-11-20 | Stop reason: HOSPADM

## 2017-11-12 RX ADMIN — HUMAN RHO(D) IMMUNE GLOBULIN 300 MCG: 300 INJECTION, SOLUTION INTRAMUSCULAR at 14:08

## 2017-11-12 RX ADMIN — FERROUS SULFATE TAB 325 MG (65 MG ELEMENTAL FE) 325 MG: 325 (65 FE) TAB at 17:06

## 2017-11-12 RX ADMIN — IBUPROFEN 800 MG: 400 TABLET, FILM COATED ORAL at 20:29

## 2017-11-12 RX ADMIN — SODIUM CHLORIDE, POTASSIUM CHLORIDE, SODIUM LACTATE AND CALCIUM CHLORIDE: 600; 310; 30; 20 INJECTION, SOLUTION INTRAVENOUS at 03:11

## 2017-11-12 RX ADMIN — KETOROLAC TROMETHAMINE 30 MG: 30 INJECTION, SOLUTION INTRAMUSCULAR at 04:55

## 2017-11-12 RX ADMIN — OXYCODONE HYDROCHLORIDE AND ACETAMINOPHEN 2 TABLET: 5; 325 TABLET ORAL at 14:53

## 2017-11-12 RX ADMIN — FERROUS SULFATE TAB 325 MG (65 MG ELEMENTAL FE) 325 MG: 325 (65 FE) TAB at 08:23

## 2017-11-12 RX ADMIN — DOCUSATE SODIUM 100 MG: 100 CAPSULE, LIQUID FILLED ORAL at 20:29

## 2017-11-12 RX ADMIN — IBUPROFEN 800 MG: 400 TABLET, FILM COATED ORAL at 11:37

## 2017-11-12 RX ADMIN — LABETALOL HYDROCHLORIDE 200 MG: 200 TABLET, FILM COATED ORAL at 17:06

## 2017-11-12 RX ADMIN — OXYCODONE HYDROCHLORIDE AND ACETAMINOPHEN 2 TABLET: 5; 325 TABLET ORAL at 08:23

## 2017-11-12 RX ADMIN — DIPHENHYDRAMINE HCL 25 MG: 25 TABLET ORAL at 08:23

## 2017-11-12 RX ADMIN — OXYCODONE HYDROCHLORIDE AND ACETAMINOPHEN 2 TABLET: 5; 325 TABLET ORAL at 04:55

## 2017-11-12 RX ADMIN — ALBUTEROL SULFATE 2.5 MG: 2.5 SOLUTION RESPIRATORY (INHALATION) at 05:40

## 2017-11-12 RX ADMIN — DOCUSATE SODIUM 100 MG: 100 CAPSULE, LIQUID FILLED ORAL at 08:23

## 2017-11-12 RX ADMIN — FLUOXETINE 40 MG: 20 CAPSULE ORAL at 08:23

## 2017-11-12 RX ADMIN — ALBUTEROL SULFATE 2.5 MG: 2.5 SOLUTION RESPIRATORY (INHALATION) at 18:29

## 2017-11-12 RX ADMIN — LABETALOL HYDROCHLORIDE 200 MG: 200 TABLET, FILM COATED ORAL at 04:55

## 2017-11-12 RX ADMIN — OXYCODONE HYDROCHLORIDE AND ACETAMINOPHEN 2 TABLET: 5; 325 TABLET ORAL at 20:29

## 2017-11-12 ASSESSMENT — PAIN SCALES - GENERAL
PAINLEVEL_OUTOF10: 5
PAINLEVEL_OUTOF10: 3
PAINLEVEL_OUTOF10: 5
PAINLEVEL_OUTOF10: 6
PAINLEVEL_OUTOF10: 6

## 2017-11-12 ASSESSMENT — PAIN DESCRIPTION - LOCATION: LOCATION: ABDOMEN

## 2017-11-12 ASSESSMENT — PAIN DESCRIPTION - PAIN TYPE: TYPE: SURGICAL PAIN

## 2017-11-12 NOTE — FLOWSHEET NOTE
Pt c/o cough and has requested breathing treatment. Lung sounds are course. Cough is non-productive at this time. She is also c/o nasal congestion. Resp called for treatment.

## 2017-11-12 NOTE — FLOWSHEET NOTE
Dr Christin Alvarado notified of lab results and urine output, 1 unit of blood ordered at this time, see orders

## 2017-11-12 NOTE — FLOWSHEET NOTE
Got pt to side of bed, then amb in room and walked to chair. Pt up in chair at this time. Tolerated fairly well, gait steady. Pericare given, small clot noted with small bleeding.

## 2017-11-13 ENCOUNTER — APPOINTMENT (OUTPATIENT)
Dept: GENERAL RADIOLOGY | Age: 37
End: 2017-11-13
Attending: OBSTETRICS & GYNECOLOGY
Payer: MEDICAID

## 2017-11-13 LAB
BASE EXCESS ARTERIAL: 0.2 MMOL/L (ref -2–2)
CARBOXYHEMOGLOBIN ARTERIAL: 2 % (ref 0–5)
D DIMER: 1.04 UG/ML FEU (ref 0–0.48)
HCO3 ARTERIAL: 24.6 MMOL/L (ref 22–26)
HCT VFR BLD CALC: 24 % (ref 37–47)
HCT VFR BLD CALC: 27.6 % (ref 37–47)
HEMOGLOBIN, ART, EXTENDED: 9 G/DL (ref 12–16)
HEMOGLOBIN: 7.7 G/DL (ref 12–16)
HEMOGLOBIN: 8.8 G/DL (ref 12–16)
LV EF: 48 %
LVEF MODALITY: NORMAL
MCH RBC QN AUTO: 28.5 PG (ref 27–31)
MCH RBC QN AUTO: 28.8 PG (ref 27–31)
MCHC RBC AUTO-ENTMCNC: 31.9 G/DL (ref 33–37)
MCHC RBC AUTO-ENTMCNC: 32.1 G/DL (ref 33–37)
MCV RBC AUTO: 88.9 FL (ref 81–99)
MCV RBC AUTO: 90.2 FL (ref 81–99)
METHEMOGLOBIN ARTERIAL: 0.7 %
O2 CONTENT ARTERIAL: 12.2 ML/DL
O2 SAT, ARTERIAL: 95.7 %
O2 THERAPY: ABNORMAL
PCO2 ARTERIAL: 38 MMHG (ref 35–45)
PDW BLD-RTO: 15.9 % (ref 11.5–14.5)
PDW BLD-RTO: 16.2 % (ref 11.5–14.5)
PH ARTERIAL: 7.42 (ref 7.35–7.45)
PLATELET # BLD: 172 K/UL (ref 130–400)
PLATELET # BLD: 247 K/UL (ref 130–400)
PMV BLD AUTO: 10.5 FL (ref 9.4–12.3)
PMV BLD AUTO: 10.7 FL (ref 9.4–12.3)
PO2 ARTERIAL: 79 MMHG (ref 80–100)
POTASSIUM, WHOLE BLOOD: 4
RBC # BLD: 2.7 M/UL (ref 4.2–5.4)
RBC # BLD: 3.06 M/UL (ref 4.2–5.4)
RPR: NORMAL
WBC # BLD: 12.4 K/UL (ref 4.8–10.8)
WBC # BLD: 8.5 K/UL (ref 4.8–10.8)

## 2017-11-13 PROCEDURE — 6370000000 HC RX 637 (ALT 250 FOR IP)

## 2017-11-13 PROCEDURE — 6370000000 HC RX 637 (ALT 250 FOR IP): Performed by: OBSTETRICS & GYNECOLOGY

## 2017-11-13 PROCEDURE — 93005 ELECTROCARDIOGRAM TRACING: CPT

## 2017-11-13 PROCEDURE — 85379 FIBRIN DEGRADATION QUANT: CPT

## 2017-11-13 PROCEDURE — 6360000002 HC RX W HCPCS: Performed by: OBSTETRICS & GYNECOLOGY

## 2017-11-13 PROCEDURE — 85027 COMPLETE CBC AUTOMATED: CPT

## 2017-11-13 PROCEDURE — 71010 XR CHEST LIMITED ONE VIEW: CPT

## 2017-11-13 PROCEDURE — 99223 1ST HOSP IP/OBS HIGH 75: CPT | Performed by: HOSPITALIST

## 2017-11-13 PROCEDURE — 2700000000 HC OXYGEN THERAPY PER DAY

## 2017-11-13 PROCEDURE — 94640 AIRWAY INHALATION TREATMENT: CPT

## 2017-11-13 PROCEDURE — 36600 WITHDRAWAL OF ARTERIAL BLOOD: CPT

## 2017-11-13 PROCEDURE — 93306 TTE W/DOPPLER COMPLETE: CPT

## 2017-11-13 PROCEDURE — 6360000002 HC RX W HCPCS: Performed by: HOSPITALIST

## 2017-11-13 PROCEDURE — 1220000000 HC SEMI PRIVATE OB R&B

## 2017-11-13 PROCEDURE — 82803 BLOOD GASES ANY COMBINATION: CPT

## 2017-11-13 PROCEDURE — 36415 COLL VENOUS BLD VENIPUNCTURE: CPT

## 2017-11-13 PROCEDURE — 84132 ASSAY OF SERUM POTASSIUM: CPT

## 2017-11-13 RX ORDER — METHYLPREDNISOLONE SODIUM SUCCINATE 125 MG/2ML
125 INJECTION, POWDER, LYOPHILIZED, FOR SOLUTION INTRAMUSCULAR; INTRAVENOUS ONCE
Status: COMPLETED | OUTPATIENT
Start: 2017-11-13 | End: 2017-11-13

## 2017-11-13 RX ORDER — MAGNESIUM SULFATE IN WATER 40 MG/ML
2 INJECTION, SOLUTION INTRAVENOUS ONCE
Status: DISCONTINUED | OUTPATIENT
Start: 2017-11-13 | End: 2017-11-13 | Stop reason: SDUPTHER

## 2017-11-13 RX ORDER — FUROSEMIDE 10 MG/ML
20 INJECTION INTRAMUSCULAR; INTRAVENOUS ONCE
Status: DISCONTINUED | OUTPATIENT
Start: 2017-11-13 | End: 2017-11-13 | Stop reason: SDUPTHER

## 2017-11-13 RX ORDER — METHYLPREDNISOLONE SODIUM SUCCINATE 40 MG/ML
40 INJECTION, POWDER, LYOPHILIZED, FOR SOLUTION INTRAMUSCULAR; INTRAVENOUS EVERY 8 HOURS
Status: DISCONTINUED | OUTPATIENT
Start: 2017-11-13 | End: 2017-11-14

## 2017-11-13 RX ORDER — MAGNESIUM SULFATE HEPTAHYDRATE 500 MG/ML
2 INJECTION, SOLUTION INTRAMUSCULAR; INTRAVENOUS ONCE
Status: COMPLETED | OUTPATIENT
Start: 2017-11-13 | End: 2017-11-13

## 2017-11-13 RX ORDER — FUROSEMIDE 10 MG/ML
20 INJECTION INTRAMUSCULAR; INTRAVENOUS ONCE
Status: COMPLETED | OUTPATIENT
Start: 2017-11-13 | End: 2017-11-13

## 2017-11-13 RX ORDER — LABETALOL 200 MG/1
200 TABLET, FILM COATED ORAL EVERY 6 HOURS
Status: DISCONTINUED | OUTPATIENT
Start: 2017-11-13 | End: 2017-11-14

## 2017-11-13 RX ORDER — FUROSEMIDE 10 MG/ML
40 INJECTION INTRAMUSCULAR; INTRAVENOUS 2 TIMES DAILY
Status: DISCONTINUED | OUTPATIENT
Start: 2017-11-13 | End: 2017-11-14

## 2017-11-13 RX ADMIN — FERROUS SULFATE TAB 325 MG (65 MG ELEMENTAL FE) 325 MG: 325 (65 FE) TAB at 15:51

## 2017-11-13 RX ADMIN — FUROSEMIDE 20 MG: 10 INJECTION INTRAMUSCULAR; INTRAVENOUS at 13:53

## 2017-11-13 RX ADMIN — METHYLPREDNISOLONE SODIUM SUCCINATE 125 MG: 125 INJECTION, POWDER, FOR SOLUTION INTRAMUSCULAR; INTRAVENOUS at 14:01

## 2017-11-13 RX ADMIN — OXYCODONE HYDROCHLORIDE AND ACETAMINOPHEN 2 TABLET: 5; 325 TABLET ORAL at 17:24

## 2017-11-13 RX ADMIN — DOCUSATE SODIUM 100 MG: 100 CAPSULE, LIQUID FILLED ORAL at 08:09

## 2017-11-13 RX ADMIN — FUROSEMIDE 40 MG: 10 INJECTION, SOLUTION INTRAMUSCULAR; INTRAVENOUS at 23:33

## 2017-11-13 RX ADMIN — LABETALOL HYDROCHLORIDE 200 MG: 200 TABLET, FILM COATED ORAL at 13:30

## 2017-11-13 RX ADMIN — MAGNESIUM SULFATE HEPTAHYDRATE 2 G: 500 INJECTION, SOLUTION INTRAMUSCULAR; INTRAVENOUS at 14:08

## 2017-11-13 RX ADMIN — OXYCODONE HYDROCHLORIDE AND ACETAMINOPHEN 2 TABLET: 5; 325 TABLET ORAL at 12:42

## 2017-11-13 RX ADMIN — METHYLPREDNISOLONE SODIUM SUCCINATE 40 MG: 40 INJECTION, POWDER, FOR SOLUTION INTRAMUSCULAR; INTRAVENOUS at 22:43

## 2017-11-13 RX ADMIN — LABETALOL HYDROCHLORIDE 200 MG: 200 TABLET, FILM COATED ORAL at 05:16

## 2017-11-13 RX ADMIN — OXYCODONE HYDROCHLORIDE AND ACETAMINOPHEN 2 TABLET: 5; 325 TABLET ORAL at 03:22

## 2017-11-13 RX ADMIN — IBUPROFEN 800 MG: 400 TABLET, FILM COATED ORAL at 05:15

## 2017-11-13 RX ADMIN — LABETALOL HYDROCHLORIDE 200 MG: 200 TABLET, FILM COATED ORAL at 17:24

## 2017-11-13 RX ADMIN — ALBUTEROL SULFATE 2.5 MG: 2.5 SOLUTION RESPIRATORY (INHALATION) at 11:47

## 2017-11-13 RX ADMIN — FLUOXETINE 40 MG: 20 CAPSULE ORAL at 08:09

## 2017-11-13 RX ADMIN — IBUPROFEN 800 MG: 400 TABLET, FILM COATED ORAL at 15:52

## 2017-11-13 RX ADMIN — FUROSEMIDE 20 MG: 10 INJECTION INTRAMUSCULAR; INTRAVENOUS at 13:11

## 2017-11-13 RX ADMIN — OXYCODONE HYDROCHLORIDE AND ACETAMINOPHEN 2 TABLET: 5; 325 TABLET ORAL at 08:10

## 2017-11-13 RX ADMIN — LABETALOL HYDROCHLORIDE 200 MG: 200 TABLET, FILM COATED ORAL at 23:34

## 2017-11-13 ASSESSMENT — PAIN SCALES - GENERAL
PAINLEVEL_OUTOF10: 4
PAINLEVEL_OUTOF10: 5
PAINLEVEL_OUTOF10: 3
PAINLEVEL_OUTOF10: 8
PAINLEVEL_OUTOF10: 2
PAINLEVEL_OUTOF10: 6

## 2017-11-13 NOTE — PROGRESS NOTES
Postpartum Day 3:  Delivery    Patient is a  that delivered on 11/10/17. The patient feels fatigued. The patient denies emotional concerns. Pain is well controlled with current medications. The baby iswell. Baby is feeding via bottle - [place] [employer] Start. Urinary output is adequate. The patient is ambulating well. The patient is tolerating a normal diet. Flatus has been passed. Objective:      Patient Vitals for the past 8 hrs:  Patient Vitals for the past 8 hrs:   BP Pulse Resp   17 0515 (!) 150/81 90 18       General:    alert, appears stated age and cooperative   Bowel Sounds:  active   Lochia:  appropriate   Uterine Fundus:   firm   Incision:  healing well, no significant drainage, no dehiscence, no significant erythema   DVT Evaluation:  No evidence of DVT seen on physical exam.     Lab Results   Component Value Date    WBC 8.5 2017    HGB 7.7 (L) 2017    HCT 24.0 (L) 2017    MCV 88.9 2017     2017     Assessment:     Status post  section. Doing well postoperatively. Plan:     Continue current care. Continue labetalol. Discuss with patient to ambulate more and then elevate feet when resting to help with swelling. Patient VU. She states she remains fatigued. HGB 8.2 yesterday and 7.7 today. Will recheck in AM. If stable will discharge home. Patient VU.

## 2017-11-13 NOTE — FLOWSHEET NOTE
During rounds, pt was asleep with infant in the bed with her. She was sleeping heavily as was her boyfriend. Nurse moved infant into the crib.  Educated mom and infant sleep safety

## 2017-11-13 NOTE — PLAN OF CARE
Problem: Anxiety:  Goal: Level of anxiety will decrease  Level of anxiety will decrease   Outcome: Ongoing      Problem: Aspiration - Risk of:  Goal: Absence of aspiration  Absence of aspiration   Outcome: Completed Date Met: 11/13/17      Problem: Tissue Perfusion - Uteroplacental, Altered:  Goal: Absence of abnormal fetal heart rate pattern  Absence of abnormal fetal heart rate pattern   Outcome: Completed Date Met: 11/13/17

## 2017-11-13 NOTE — CARE COORDINATION
Spoke with OB RN Jordana Lozano who states pt and spouse require resources and education for assistance upon dc. Consult re: how pt speaks to infant as if infant should understand is not a concern.

## 2017-11-13 NOTE — CARE COORDINATION
Maternal grandmother [facility]) to infant was on unit making statements such as \"I'll go get a gun and handle it! \" and \"She feels like the  was planned to keep her from seeing her grandchild. \" Security was present and spoke with [facility] who stated did not mean the threat and was just upset. SW spoke with LUCA who stated her daughter (pt) and Julissa Silence do this! \" and find ways to keep her out of their lives. LUCA stated she should \"just kill herself and make it easy. \" SW explained making generalized statements such as the gun and the killing herself statement cause people concern and there are counselors or other individuals (inculding YUVAL) who are jazmin to help LUCA. LUCA reports she does not mean that she's going to kill herself and \"Is not going to hell for anybody!\" because she believes people who commit suicide go to hell. LUCA reports residing in [address] [place] [address] and her sister brought her to [facility] for the birth. SW asked where LUCA intended to stay or how she was to return to [facility]. LUCA states intentions to sleep in maternity waiting room \"like she did last night. \" YUVAL explained LUCA cannot stay in waiting area and if unable to be civil with pt and FoB then [facility] cannot remain on maternity floor. YUVAL spoke with pt and FoB who report LUCA believe the  was scheduled to keep her away from the baby and is \"causing all kinds of drama. \" Pt and FoB were very upset and stated LUCA cannot remain at hospital causing these issues. FoB reports his father is on his way to hospital and [facility] will lose his shit\" on [place] if she does this again. SW explained no matter who is causing the problems if it continues all family will be asked not to return to maternity unit d/t unnecessary stress for pt. FoB called LUCA's sister to ask if able to come  [facility] and return her to [facility] and she stated she was not.  FoB reports able to transport LUCA back to [facility] and can be civil during that time to ensure both of their safety. SW reported the offer to [facility] and took her to pt room to speak with pt and FoB. MGM attempted to say \"this is waht pt always does trying to keep her from her grandchild! \" Pt became upset and crying stating \"I did not plan this fucking ! \" and the two began to curse at each other and argue. SW stopped the pt and MGM from speaking stating this will not continue and MGM needs to leave. MGM left the room and the unit and waited for FoB by the main elevators. SW stayed with [facility] until FoB came to elevators and both agreed to leave without further incident and be civil to each other during the trip. SW will continue to follow and speak with RN staff re: pt care needs and further issues while admitted.

## 2017-11-13 NOTE — CONSULTS
[address] [facility]    0215/0215-01    Reason for consultation:  Asthma / shortness of breath  Requesting physician: Dr. Ashli Sidhu  PCP:  Dr. Prashanth Nathan  History Obtained From:  patient, spouse, nursing    Chief complaint:  Feels like she can't breath    Subjective:  Very anxious earlier, had a panic attack. Then later developed worsening shortness of breath, cough and wheeze. Feels tired. Legs are swollen. No history of heart issues. Otherwise has done well after her  (POD#3). History of present illness: The patient is a 30 y.o. female who presents 36W2D with chronic hypertension on labetalol. Hypertension led to early . Normal post operative course, did require 1 unit PRBC to be infused. Today she began the day with a panic attack.  reports her mother has been present and causing a lot of stress in the room. Her legs are swollen. Suddently she developed acute shortness of breath and saturation was at 70%. She received a proventil treatment, 20mg lasix IV and oxygen with venti-mask. Saturations now at 94%. She has nonlabored breathing, appears tired but received percocet recently. She had mild intermittent asthma prior to admission and only occasionally had to use proventil. Lasix, solumedrol, magnesium and echocardiogram have been ordered. ABG pH 7.42 CO2 38 O2 79.     Past Medical History:        Diagnosis Date    Asthma     Depression     Generalized anxiety disorder     Herpes simplex virus (HSV) infection     mouth sores    Hypertension     Sensitive skin     Has issues using fabric softeners and certain soaps       Past Surgical History:        Procedure Laterality Date     SECTION, LOW TRANSVERSE         Medications Current:    Current Facility-Administered Medications   Medication Dose Route Frequency Provider Last Rate Last Dose    methylPREDNISolone sodium (SOLU-MEDROL) injection 125 mg  125 mg Intravenous Once Sherry E MD Antonia        methylPREDNISolone sodium (SOLU-MEDROL) injection 40 mg  40 mg Intravenous Q8H Sherry Macias MD        furosemide (LASIX) injection 20 mg  20 mg Intravenous Once Faustina Ramirez MD        magnesium sulfate injection 2 g  2 g Intravenous Once Faustina Ramirez MD        furosemide (LASIX) injection 20 mg  20 mg Intravenous Once Faustina Ramirez MD        furosemide (LASIX) injection 40 mg  40 mg Intravenous BID Faustina Ramirez MD        diphenhydrAMINE (BENADRYL) tablet 25 mg  25 mg Oral Q6H PRN Francesco Del Toro MD   25 mg at 11/12/17 0823    0.9 % sodium chloride bolus  250 mL Intravenous Once Francesco Del Toro MD        ibuprofen (ADVIL;MOTRIN) tablet 800 mg  800 mg Oral Q6H PRN Francesco Del Toro MD   800 mg at 11/13/17 0515    oxyCODONE-acetaminophen (PERCOCET) 5-325 MG per tablet 2 tablet  2 tablet Oral Q4H PRN Claudine Allen MD   2 tablet at 11/13/17 1242    0.9 % sodium chloride bolus  250 mL Intravenous Once Francesco Del Toro MD        labetalol (NORMODYNE) tablet 200 mg  200 mg Oral 2 times per day Francesco Del Toro MD   200 mg at 11/13/17 1330    FLUoxetine (PROZAC) capsule 40 mg  40 mg Oral Daily Francesco Del Toro MD   40 mg at 11/13/17 0809    ferrous sulfate tablet 325 mg  325 mg Oral BID WC Francesco Del Toro MD   325 mg at 11/12/17 1706    acetaminophen (TYLENOL) tablet 650 mg  650 mg Oral Q4H PRN Claudine Allen MD        ondansetron [facility]) injection 4 mg  4 mg Intravenous Q6H PRN Claudine Allen MD        lidocaine PF 1 % injection 30 mL  30 mL Other PRN Claudine Allen MD        butorphanol (STADOL) injection 1 mg  1 mg Intravenous Q1H PRN Claudine Allen MD        oxytocin (PITOCIN) 30 units in 500 mL infusion  1 edgard-units/min Intravenous Continuous Claudine Allen  mL/hr at 11/10/17 2051 166 edgard-units/min at 11/10/17 2051    docusate sodium (COLACE) capsule 100 mg  100 mg Oral BID Claudine Allen MD   100 mg at 11/13/17 0809    bisacodyl (DULCOLAX) suppository 10 mg  10 mg Rectal Daily PRN Giovanna Massey MD        ondansetron [facility]) injection 8 mg  8 mg Intravenous Q8H PRN Giovanna Massey MD        oxytocin (PITOCIN) 30 units in 500 mL infusion  1 edgard-units/min Intravenous Continuous PRN Giovanna Massey MD        albuterol (PROVENTIL) nebulizer solution 2.5 mg  2.5 mg Nebulization Q4H PRN Giovanna Massey MD   2.5 mg at 11/13/17 1147    acetaminophen (TYLENOL) tablet 650 mg  650 mg Oral Q4H PRN Giovanna Massey MD        oxytocin (PITOCIN) 30 units in 500 mL infusion  1 edgard-units/min Intravenous Continuous Giovanna Massey MD   Stopped at 11/11/17 2824    lanolin ointment   Topical Q1H PRN Giovanna Massey MD        Tetanus-Diphth-Acell Pertussis (BOOSTRIX) injection 0.5 mL  0.5 mL Intramuscular Prior to discharge Giovanna Massey MD        oxyCODONE-acetaminophen (PERCOCET) 5-325 MG per tablet 1 tablet  1 tablet Oral Q4H PRN Giovanna Massey MD   1 tablet at 11/10/17 2308       Allergies:  Vinyl ether    Social History:   TOBACCO:   reports that she quit smoking about 2 years ago. She has never used smokeless tobacco.  ETOH:   reports that she does not drink alcohol.   Patient currently lives with family at home    Family History:       Problem Relation Age of Onset    Hypertension Mother     Stroke Mother     Breast Cancer Maternal Grandmother 64    Heart Failure Maternal Grandmother     Lung Cancer Maternal Grandfather     Mental Retardation Brother     ADHD Brother     Other Brother      Autism        REVIEW OF SYSTEMS:  Constitutional / general:  No fever / chills / sweats  Head:  No headache / neck stiffness / trauma / visual change  Eyes:  No blurry vision / acute visual change or loss / itching / redness  ENT: No sore throat / hoarseness / nasal drainage / ear pain  CV:  No chest pain / palpitations / + orthopnea   Respiratory:  + cough / shortness of breath No sputum / hemoptysis  GI: No nausea / vomiting / abdominal pain / diarrhea / constipation  :  No dysuria / hesitancy / urgency / hematuria   + Sore from   Neuro: No paralysis / syncope / seizure / dysphagia / headache / paresthesias  Musculoskeletal:  No muscle weakness / joint stiffness / pain  Vascular: + edema No claudication / thrombosis / varicosities  Heme / endocrine: No easy bruising / bleeding / excessive sweating / heat or cold intolerance  Psychiatric:  No depression + anxiety / insomnia / mood changes Her mother is causing great stress in the room cursing and threatening to kill herself. Skin:  No new rashes / lesions / skin hair or nail changes      PHYSICAL EXAM:  BP (!) 178/90   Pulse 102   Temp 97 °F (36.1 °C) (Temporal)   Resp 18   LMP 2017 (Approximate)   SpO2 98%   Breastfeeding? Unknown   General appearance: alert, appears stated age, cooperative and no distress  Head: Normocephalic, without obvious abnormality, atraumatic  Eyes: conjunctivae/corneas clear. PERRL, EOM's intact. Ears: normal external ears  Neck: no adenopathy, no carotid bruit, no JVD, supple, symmetrical, trachea midline and thyroid not enlarged, symmetric, no tenderness/mass/nodules  Lungs: Crackles throughout, Tight and high pitched wheeze throughout, poor air movement   Heart: regular rate and rhythm, S1, S2 normal, no murmur,  regular rate and rhythm   Abdomen:soft, non-tender; non-distended normal bowel sounds no masses, no organomegaly  Extremities:Pedal edema 3+ to knees  Homans sign is negative, no sign of DVT  Skin: Skin color, texture, turgor normal. No rashes or lesions  Lymphatic: No palpable lymph node enlargment  Neurologic: Alert and oriented X 3, normal strength and tone. Normal symmetric reflexes. Mental status: Alert, oriented, thought content appropriate  Cranial nerves:II-XII Grossly intact Sensory: normal Motor:grossly normal  Psychiatric:  Anxious on exam, tearful. Seems to be a lot of stress in the room.       DIAGNOSTIC DATA:  CXR: I have

## 2017-11-14 ENCOUNTER — APPOINTMENT (OUTPATIENT)
Dept: GENERAL RADIOLOGY | Age: 37
End: 2017-11-14
Attending: OBSTETRICS & GYNECOLOGY
Payer: MEDICAID

## 2017-11-14 PROBLEM — J45.51 SEVERE PERSISTENT ASTHMA WITH EXACERBATION: Status: ACTIVE | Noted: 2017-11-14

## 2017-11-14 PROBLEM — I50.21 ACUTE SYSTOLIC CONGESTIVE HEART FAILURE (HCC): Status: ACTIVE | Noted: 2017-11-14

## 2017-11-14 PROBLEM — D62 ACUTE BLOOD LOSS ANEMIA: Status: ACTIVE | Noted: 2017-11-14

## 2017-11-14 PROBLEM — D72.829 LEUKOCYTOSIS: Status: ACTIVE | Noted: 2017-11-14

## 2017-11-14 LAB
ANION GAP SERPL CALCULATED.3IONS-SCNC: 12 MMOL/L (ref 7–19)
BLOOD BANK DISPENSE STATUS: NORMAL
BLOOD BANK DISPENSE STATUS: NORMAL
BLOOD BANK PRODUCT CODE: NORMAL
BLOOD BANK PRODUCT CODE: NORMAL
BPU ID: NORMAL
BPU ID: NORMAL
BUN BLDV-MCNC: 17 MG/DL (ref 6–20)
CALCIUM SERPL-MCNC: 9.3 MG/DL (ref 8.6–10)
CHLORIDE BLD-SCNC: 102 MMOL/L (ref 98–111)
CO2: 25 MMOL/L (ref 22–29)
CREAT SERPL-MCNC: 0.9 MG/DL (ref 0.5–0.9)
DESCRIPTION BLOOD BANK: NORMAL
DESCRIPTION BLOOD BANK: NORMAL
GFR NON-AFRICAN AMERICAN: >60
GLUCOSE BLD-MCNC: 130 MG/DL (ref 74–109)
POTASSIUM SERPL-SCNC: 4.7 MMOL/L (ref 3.5–5)
PRO-BNP: 571 PG/ML (ref 0–450)
SODIUM BLD-SCNC: 139 MMOL/L (ref 136–145)
URINE CULTURE, ROUTINE: NORMAL

## 2017-11-14 PROCEDURE — 6360000002 HC RX W HCPCS: Performed by: HOSPITALIST

## 2017-11-14 PROCEDURE — 2700000000 HC OXYGEN THERAPY PER DAY

## 2017-11-14 PROCEDURE — 93005 ELECTROCARDIOGRAM TRACING: CPT

## 2017-11-14 PROCEDURE — 83880 ASSAY OF NATRIURETIC PEPTIDE: CPT

## 2017-11-14 PROCEDURE — 80048 BASIC METABOLIC PNL TOTAL CA: CPT

## 2017-11-14 PROCEDURE — 6370000000 HC RX 637 (ALT 250 FOR IP): Performed by: HOSPITALIST

## 2017-11-14 PROCEDURE — 59514 CESAREAN DELIVERY ONLY: CPT | Performed by: OBSTETRICS & GYNECOLOGY

## 2017-11-14 PROCEDURE — 71020 XR CHEST STANDARD TWO VW: CPT

## 2017-11-14 PROCEDURE — 99253 IP/OBS CNSLTJ NEW/EST LOW 45: CPT | Performed by: INTERNAL MEDICINE

## 2017-11-14 PROCEDURE — 6370000000 HC RX 637 (ALT 250 FOR IP): Performed by: NURSE PRACTITIONER

## 2017-11-14 PROCEDURE — 94640 AIRWAY INHALATION TREATMENT: CPT

## 2017-11-14 PROCEDURE — 93970 EXTREMITY STUDY: CPT

## 2017-11-14 PROCEDURE — 6370000000 HC RX 637 (ALT 250 FOR IP): Performed by: PHYSICIAN ASSISTANT

## 2017-11-14 PROCEDURE — 1220000000 HC SEMI PRIVATE OB R&B

## 2017-11-14 PROCEDURE — 36415 COLL VENOUS BLD VENIPUNCTURE: CPT

## 2017-11-14 PROCEDURE — 6370000000 HC RX 637 (ALT 250 FOR IP)

## 2017-11-14 PROCEDURE — 6370000000 HC RX 637 (ALT 250 FOR IP): Performed by: OBSTETRICS & GYNECOLOGY

## 2017-11-14 PROCEDURE — 6360000002 HC RX W HCPCS: Performed by: OBSTETRICS & GYNECOLOGY

## 2017-11-14 PROCEDURE — 6370000000 HC RX 637 (ALT 250 FOR IP): Performed by: INTERNAL MEDICINE

## 2017-11-14 RX ORDER — ALBUTEROL SULFATE 2.5 MG/3ML
2.5 SOLUTION RESPIRATORY (INHALATION) EVERY 4 HOURS PRN
Status: DISCONTINUED | OUTPATIENT
Start: 2017-11-14 | End: 2017-11-19

## 2017-11-14 RX ORDER — GUAIFENESIN 600 MG/1
600 TABLET, EXTENDED RELEASE ORAL EVERY 6 HOURS
Status: DISCONTINUED | OUTPATIENT
Start: 2017-11-14 | End: 2017-11-20 | Stop reason: HOSPADM

## 2017-11-14 RX ORDER — NIFEDIPINE 30 MG/1
30 TABLET, EXTENDED RELEASE ORAL DAILY
Status: DISCONTINUED | OUTPATIENT
Start: 2017-11-14 | End: 2017-11-15

## 2017-11-14 RX ORDER — IPRATROPIUM BROMIDE AND ALBUTEROL SULFATE 2.5; .5 MG/3ML; MG/3ML
1 SOLUTION RESPIRATORY (INHALATION)
Status: DISCONTINUED | OUTPATIENT
Start: 2017-11-14 | End: 2017-11-14

## 2017-11-14 RX ORDER — PREDNISONE 1 MG/1
20 TABLET ORAL 2 TIMES DAILY
Status: DISCONTINUED | OUTPATIENT
Start: 2017-11-14 | End: 2017-11-16

## 2017-11-14 RX ORDER — FUROSEMIDE 40 MG/1
40 TABLET ORAL 2 TIMES DAILY
Status: DISCONTINUED | OUTPATIENT
Start: 2017-11-14 | End: 2017-11-15

## 2017-11-14 RX ORDER — IPRATROPIUM BROMIDE AND ALBUTEROL SULFATE 2.5; .5 MG/3ML; MG/3ML
1 SOLUTION RESPIRATORY (INHALATION) EVERY 4 HOURS
Status: DISCONTINUED | OUTPATIENT
Start: 2017-11-14 | End: 2017-11-19

## 2017-11-14 RX ORDER — LABETALOL 200 MG/1
200 TABLET, FILM COATED ORAL EVERY 6 HOURS
Status: DISCONTINUED | OUTPATIENT
Start: 2017-11-14 | End: 2017-11-16

## 2017-11-14 RX ADMIN — FUROSEMIDE 40 MG: 40 TABLET ORAL at 17:29

## 2017-11-14 RX ADMIN — IBUPROFEN 800 MG: 400 TABLET, FILM COATED ORAL at 06:00

## 2017-11-14 RX ADMIN — FERROUS SULFATE TAB 325 MG (65 MG ELEMENTAL FE) 325 MG: 325 (65 FE) TAB at 10:56

## 2017-11-14 RX ADMIN — DOCUSATE SODIUM 100 MG: 100 CAPSULE, LIQUID FILLED ORAL at 21:10

## 2017-11-14 RX ADMIN — DOCUSATE SODIUM 100 MG: 100 CAPSULE, LIQUID FILLED ORAL at 09:15

## 2017-11-14 RX ADMIN — OXYCODONE HYDROCHLORIDE AND ACETAMINOPHEN 1 TABLET: 5; 325 TABLET ORAL at 22:28

## 2017-11-14 RX ADMIN — ALBUTEROL SULFATE 2.5 MG: 2.5 SOLUTION RESPIRATORY (INHALATION) at 09:20

## 2017-11-14 RX ADMIN — OXYCODONE HYDROCHLORIDE AND ACETAMINOPHEN 2 TABLET: 5; 325 TABLET ORAL at 06:00

## 2017-11-14 RX ADMIN — IPRATROPIUM BROMIDE AND ALBUTEROL SULFATE 1 AMPULE: .5; 3 SOLUTION RESPIRATORY (INHALATION) at 13:20

## 2017-11-14 RX ADMIN — IBUPROFEN 800 MG: 400 TABLET, FILM COATED ORAL at 14:38

## 2017-11-14 RX ADMIN — PREDNISONE 20 MG: 5 TABLET ORAL at 10:57

## 2017-11-14 RX ADMIN — FUROSEMIDE 40 MG: 40 TABLET ORAL at 11:42

## 2017-11-14 RX ADMIN — LABETALOL HYDROCHLORIDE 200 MG: 200 TABLET, FILM COATED ORAL at 23:16

## 2017-11-14 RX ADMIN — NIFEDIPINE 30 MG: 30 TABLET, FILM COATED, EXTENDED RELEASE ORAL at 21:09

## 2017-11-14 RX ADMIN — OXYCODONE HYDROCHLORIDE AND ACETAMINOPHEN 1 TABLET: 5; 325 TABLET ORAL at 16:45

## 2017-11-14 RX ADMIN — IPRATROPIUM BROMIDE AND ALBUTEROL SULFATE 1 AMPULE: .5; 3 SOLUTION RESPIRATORY (INHALATION) at 19:44

## 2017-11-14 RX ADMIN — OXYCODONE HYDROCHLORIDE AND ACETAMINOPHEN 2 TABLET: 5; 325 TABLET ORAL at 11:06

## 2017-11-14 RX ADMIN — IPRATROPIUM BROMIDE AND ALBUTEROL SULFATE 1 AMPULE: .5; 3 SOLUTION RESPIRATORY (INHALATION) at 15:42

## 2017-11-14 RX ADMIN — PREDNISONE 20 MG: 5 TABLET ORAL at 21:09

## 2017-11-14 RX ADMIN — GUAIFENESIN 600 MG: 600 TABLET, EXTENDED RELEASE ORAL at 17:30

## 2017-11-14 RX ADMIN — LABETALOL HYDROCHLORIDE 200 MG: 200 TABLET, FILM COATED ORAL at 05:57

## 2017-11-14 RX ADMIN — FLUOXETINE 40 MG: 20 CAPSULE ORAL at 09:15

## 2017-11-14 RX ADMIN — LABETALOL HYDROCHLORIDE 300 MG: 200 TABLET, FILM COATED ORAL at 10:57

## 2017-11-14 RX ADMIN — LABETALOL HYDROCHLORIDE 300 MG: 200 TABLET, FILM COATED ORAL at 16:45

## 2017-11-14 RX ADMIN — GUAIFENESIN 600 MG: 600 TABLET, EXTENDED RELEASE ORAL at 11:43

## 2017-11-14 RX ADMIN — METHYLPREDNISOLONE SODIUM SUCCINATE 40 MG: 40 INJECTION, POWDER, FOR SOLUTION INTRAMUSCULAR; INTRAVENOUS at 05:57

## 2017-11-14 RX ADMIN — GUAIFENESIN 600 MG: 600 TABLET, EXTENDED RELEASE ORAL at 23:16

## 2017-11-14 RX ADMIN — FERROUS SULFATE TAB 325 MG (65 MG ELEMENTAL FE) 325 MG: 325 (65 FE) TAB at 17:29

## 2017-11-14 ASSESSMENT — PAIN SCALES - GENERAL
PAINLEVEL_OUTOF10: 5
PAINLEVEL_OUTOF10: 4
PAINLEVEL_OUTOF10: 6
PAINLEVEL_OUTOF10: 7
PAINLEVEL_OUTOF10: 4
PAINLEVEL_OUTOF10: 5

## 2017-11-14 NOTE — PROGRESS NOTES
hematuria   Neuro: Denies paralysis / syncope / seizure / dysphagia / headache / paresthesias  Musculoskeletal:  Denies muscle weakness /joint stiffness / pain  Vascular: Denies edema / claudication / varicosities  Heme / endocrine: Denies easy bruising / bleeding / excessive sweating / heat or cold intolerance  Psychiatric:  Denies depression / anxiety / insomnia / mood changes  Skin:  Denies new rashes / lesions / skin hair or nail changes    14 point review of systems is negative except as specifically addressed above. Objective:   VITALS:  BP (!) 178/91   Pulse 102   Temp 96.1 °F (35.6 °C)   Resp 18   LMP 02/02/2017 (Approximate)   SpO2 90%   Breastfeeding? Unknown   24HR INTAKE/OUTPUT:      Intake/Output Summary (Last 24 hours) at 11/14/17 1536  Last data filed at 11/14/17 0636   Gross per 24 hour   Intake                0 ml   Output             1875 ml   Net            -1875 ml     General appearance: alert, appears stated age, cooperative and no distress  Head: Normocephalic, without obvious abnormality, atraumatic  Eyes: conjunctivae/corneas clear. PERRL, EOM's intact. Ears: normal external ears and nose, throat without exudate  Neck: no adenopathy, no carotid bruit, no JVD, supple, symmetrical, trachea midline and thyroid not enlarged, symmetric, no tenderness/mass/nodules  Lungs: Coarse air exchange with diffuse expiratory wheezes, faint bibasilar rales  Heart: regular rate and rhythm, S1, S2 normal, I/VI systolic murmur  Abdomen:soft, non-tender; non-distended, normal bowel sounds no masses, no organomegaly  Extremities:Trace lower extremity edema,  No erythema, no tenderness to palpation  Skin: Skin color, texture, turgor normal. No rashes or lesions  Lymphatic: No palpable lymph node enlargment  Neurologic: Alert and oriented X 3, normal strength and tone. Normal symmetric reflexes.  Mental status: Alert, oriented, thought content appropriate  Cranial nerves:II-XII Grossly intact Sensory: normal Motor:grossly normal  Psychiatric: Alert and oriented, thought content appropriate, normal insight, mood appropriate    Medications:      oxytocin 166 edgard-units/min (11/10/17 2051)    oxytocin      oxytocin Stopped (11/11/17 0655)      furosemide  40 mg Oral BID    guaiFENesin  600 mg Oral Q6H    predniSONE  20 mg Oral BID    labetalol  300 mg Oral Q6H    ipratropium-albuterol  1 ampule Inhalation Q4H    sodium chloride  250 mL Intravenous Once    sodium chloride  250 mL Intravenous Once    FLUoxetine  40 mg Oral Daily    ferrous sulfate  325 mg Oral BID WC    docusate sodium  100 mg Oral BID     Tetanus-Diphth-Acell Pertussis  0.5 mL Intramuscular Prior to discharge     albuterol, diphenhydrAMINE, ibuprofen, oxyCODONE-acetaminophen, acetaminophen, ondansetron, lidocaine PF, butorphanol, bisacodyl, ondansetron, oxytocin, acetaminophen, lanolin, oxyCODONE-acetaminophen  DIET GENERAL;     Lab and other Data:     Recent Labs      11/12/17   1542  11/13/17   0533  11/13/17   2247   WBC  9.7  8.5  12.4*   HGB  8.1*  7.7*  8.8*   PLT  184  172  247     Recent Labs      11/13/17   1349   K  4.0     ABGs:   Lab Results   Component Value Date    PHART 7.420 11/13/2017    PO2ART 79.0 11/13/2017    NRY7BAO 38.0 11/13/2017     RAD:   CXR 11/13/2017  Findings concerning for pulmonary edema versus infection the proper clinical setting.     Echo: 11/13/2017  Mild concentric left ventricular hypertrophy.   Left ventricular size is normal .   Borderline ejection fraction with an ejection fraction of 45-50%    Micro: Urine culture no growth    Assessment/Plan   Principle Problem:    Acute systolic CHF-post partum, continue Lasix, Cardiology to follow    Active problems:    Severe persistent Asthma exacerbation-Duonebs, mucinex, prednisone    Preeclampsia, third trimester-noted, Labetolol continued for HTN    Leukocytosis-likely 2' steroids    Acute blood loss anemia-monitor     Victor Manuel Terry PA-C     I have independently interviewed and examined the patient. I have discussed key elements of the care plan with the PA or APRN & agree with the findings and care plan as documented above. Carlos Luna MD    CC: SOA  S: Onset SOA yesterday after \"drama\" per pt. Recently uses inhaler TID, previously used rarely. Mild tightness in chest but no CP and no pleuritic discomfort. Calves mildly tender. Breathing much better this afternoon than this am once she has received nebulizer treatments and steroids, felt worse after not having nebulizers overnight. O:Vitals: BP (!) 178/91   Pulse 102   Temp 96.1 °F (35.6 °C)   Resp 18   LMP 02/02/2017 (Approximate)   SpO2 90%   Breastfeeding? Unknown   24HR INTAKE/OUTPUT:      Intake/Output Summary (Last 24 hours) at 11/14/17 1536  Last data filed at 11/14/17 0636   Gross per 24 hour   Intake                0 ml   Output             1875 ml   Net            -1875 ml     General appearance: alert and cooperative with exam  HEENT: atraumatic, eyes with clear conjunctiva and normal lids, pupils and irises normal, external ears and nose are normal, lips normal. Neck without masses, lympadenopathy, bruit, thyroid normal  Lungs: no increased work of breathing, clear to auscultation bilaterally without rales, rhonchi or wheezes  Heart: regular rate and rhythm, S1, S2 normal, no murmur, click, rub or gallop  Abdomen: soft, non-tender; bowel sounds normal; no masses,  no organomegaly  Extremities: extremities normal, atraumatic, no cyanosis or edema  Neurologic: No focal neurologic deficits, normal sensation, alert and oriented, affect and mood appropriate. Skin: no rashes, nodules. A/P: Likely asthma flare, improving with rx for this, will need steroid taper, check legs for DVT (US), 2 V CXR. Scheduled duonebs, follow clinically.

## 2017-11-14 NOTE — OP NOTE
cord. The uterus was exteriorized, cleared of all clots and debris and repaired with #1 vicryl in a running locked fashion. A second imbricating layer of 1 chromic was used for uterine strength and bladder flap. The uterus was placed back into the abdomen, copious irrigation took place, hemostasis was assured with Bovie cautery. The peritoneum was closed with a 0-chromic, the fascia was inspected and found to be hemostatic and closed with looped 0 vicryl from both angles tied in the middle. The subcutaneous tissue was irrigated, made hemostatic with Bovie cautery and closed with chromic interrupted. The skin was closed Insorb subcutaneous stapler. Sponge, lap and needle counts were correct x 2. The patient tolerated the procedure well.      Electronically signed by Houston Nassar MD on 11/14/17 at 10:31 AM

## 2017-11-14 NOTE — CONSULTS
11/13/17   2247   WBC  9.7  8.5  12.4*   HGB  8.1*  7.7*  8.8*   PLT  184  172  247     Recent Labs      11/13/17   1349   K  4.0     No results for input(s): TROPONINI, BNP in the last 72 hours. ECG 11/14/17: pending     TTE 11/13/2017:  Mild concentric left ventricular hypertrophy. Left ventricular size is normal. Borderline ejection fraction with an ejection fraction of 45-50%    Cath: N/A      Assessment, Recommendations & Plan:  30 y.o. female with hypertension and postpartum day 3.     1. Shortness of breath- Check BNP and BMP. Continue Lasix, Steriods, Mucinex, and Breathing treatments. TTE results reviewed. 2. Hypertension- Continue Labetalol. Monitor. 3. Acute blood loss anemia- monitor    Will continue to follow patient clinically. Esteban Marquis, [address] [facility]                                                                                                                                                   Addendum                                                                                                                              I have independently seen and evaluated the patient and agree with the above assessment. She is a 68-year-old lady with a past medical history of hypertension, anxiety and depression who's had difficult to control blood pressures throughout her pregnancy. She tells me that she's had blood pressures in the 100S to 800N systolic as well as diastolics in the 702N. She's been compliant with her medication so she says. Since being here, she has had large swings in her blood pressure with highs in the 200s only down to 678Y systolic. She was diuresed after having acute shortness of air which she says may be related to panic attack. She is now been diuresed to nearly 4 L net negative since admission. She says she feels much better from a breathing standpoint. She also has asthma which she's been using inhalers and some steroids.

## 2017-11-15 ENCOUNTER — APPOINTMENT (OUTPATIENT)
Dept: GENERAL RADIOLOGY | Age: 37
End: 2017-11-15
Attending: OBSTETRICS & GYNECOLOGY
Payer: MEDICAID

## 2017-11-15 LAB
ALBUMIN SERPL-MCNC: 2.8 G/DL (ref 3.5–5.2)
ALP BLD-CCNC: 123 U/L (ref 35–104)
ALT SERPL-CCNC: 37 U/L (ref 5–33)
ANION GAP SERPL CALCULATED.3IONS-SCNC: 12 MMOL/L (ref 7–19)
ANION GAP SERPL CALCULATED.3IONS-SCNC: 12 MMOL/L (ref 7–19)
AST SERPL-CCNC: 28 U/L (ref 5–32)
BILIRUB SERPL-MCNC: 0.3 MG/DL (ref 0.2–1.2)
BUN BLDV-MCNC: 21 MG/DL (ref 6–20)
BUN BLDV-MCNC: 21 MG/DL (ref 6–20)
CALCIUM SERPL-MCNC: 8.7 MG/DL (ref 8.6–10)
CALCIUM SERPL-MCNC: 8.9 MG/DL (ref 8.6–10)
CHLORIDE BLD-SCNC: 100 MMOL/L (ref 98–111)
CHLORIDE BLD-SCNC: 103 MMOL/L (ref 98–111)
CO2: 27 MMOL/L (ref 22–29)
CO2: 29 MMOL/L (ref 22–29)
CREAT SERPL-MCNC: 0.8 MG/DL (ref 0.5–0.9)
CREAT SERPL-MCNC: 0.8 MG/DL (ref 0.5–0.9)
EKG P AXIS: 76 DEGREES
EKG P-R INTERVAL: 154 MS
EKG Q-T INTERVAL: 336 MS
EKG QRS DURATION: 78 MS
EKG QTC CALCULATION (BAZETT): 413 MS
EKG T AXIS: 58 DEGREES
GFR NON-AFRICAN AMERICAN: >60
GFR NON-AFRICAN AMERICAN: >60
GLUCOSE BLD-MCNC: 120 MG/DL (ref 74–109)
GLUCOSE BLD-MCNC: 122 MG/DL (ref 74–109)
HCT VFR BLD CALC: 29.2 % (ref 37–47)
HEMOGLOBIN: 9.3 G/DL (ref 12–16)
MCH RBC QN AUTO: 28.1 PG (ref 27–31)
MCHC RBC AUTO-ENTMCNC: 31.8 G/DL (ref 33–37)
MCV RBC AUTO: 88.2 FL (ref 81–99)
PDW BLD-RTO: 15.9 % (ref 11.5–14.5)
PLATELET # BLD: 304 K/UL (ref 130–400)
PMV BLD AUTO: 10.2 FL (ref 9.4–12.3)
POTASSIUM SERPL-SCNC: 3.9 MMOL/L (ref 3.5–5)
POTASSIUM SERPL-SCNC: 4 MMOL/L (ref 3.5–5)
PRO-BNP: 726 PG/ML (ref 0–450)
RBC # BLD: 3.31 M/UL (ref 4.2–5.4)
SODIUM BLD-SCNC: 141 MMOL/L (ref 136–145)
SODIUM BLD-SCNC: 142 MMOL/L (ref 136–145)
TOTAL PROTEIN: 6.2 G/DL (ref 6.6–8.7)
WBC # BLD: 14.1 K/UL (ref 4.8–10.8)

## 2017-11-15 PROCEDURE — 99233 SBSQ HOSP IP/OBS HIGH 50: CPT | Performed by: HOSPITALIST

## 2017-11-15 PROCEDURE — 99233 SBSQ HOSP IP/OBS HIGH 50: CPT | Performed by: INTERNAL MEDICINE

## 2017-11-15 PROCEDURE — 80053 COMPREHEN METABOLIC PANEL: CPT

## 2017-11-15 PROCEDURE — 87040 BLOOD CULTURE FOR BACTERIA: CPT

## 2017-11-15 PROCEDURE — 6370000000 HC RX 637 (ALT 250 FOR IP): Performed by: INTERNAL MEDICINE

## 2017-11-15 PROCEDURE — 6370000000 HC RX 637 (ALT 250 FOR IP): Performed by: OBSTETRICS & GYNECOLOGY

## 2017-11-15 PROCEDURE — 85027 COMPLETE CBC AUTOMATED: CPT

## 2017-11-15 PROCEDURE — 6360000004 HC RX CONTRAST MEDICATION: Performed by: OBSTETRICS & GYNECOLOGY

## 2017-11-15 PROCEDURE — 6370000000 HC RX 637 (ALT 250 FOR IP)

## 2017-11-15 PROCEDURE — 6360000002 HC RX W HCPCS: Performed by: INTERNAL MEDICINE

## 2017-11-15 PROCEDURE — 36415 COLL VENOUS BLD VENIPUNCTURE: CPT

## 2017-11-15 PROCEDURE — 94660 CPAP INITIATION&MGMT: CPT

## 2017-11-15 PROCEDURE — 6370000000 HC RX 637 (ALT 250 FOR IP): Performed by: NURSE PRACTITIONER

## 2017-11-15 PROCEDURE — 6370000000 HC RX 637 (ALT 250 FOR IP): Performed by: HOSPITALIST

## 2017-11-15 PROCEDURE — 71010 XR CHEST PORTABLE: CPT

## 2017-11-15 PROCEDURE — 2000000000 HC ICU R&B

## 2017-11-15 PROCEDURE — 94640 AIRWAY INHALATION TREATMENT: CPT

## 2017-11-15 PROCEDURE — 93308 TTE F-UP OR LMTD: CPT

## 2017-11-15 PROCEDURE — 2700000000 HC OXYGEN THERAPY PER DAY

## 2017-11-15 PROCEDURE — 83880 ASSAY OF NATRIURETIC PEPTIDE: CPT

## 2017-11-15 RX ORDER — FUROSEMIDE 10 MG/ML
80 INJECTION INTRAMUSCULAR; INTRAVENOUS ONCE
Status: COMPLETED | OUTPATIENT
Start: 2017-11-15 | End: 2017-11-15

## 2017-11-15 RX ORDER — FUROSEMIDE 10 MG/ML
40 INJECTION INTRAMUSCULAR; INTRAVENOUS 2 TIMES DAILY
Status: DISCONTINUED | OUTPATIENT
Start: 2017-11-15 | End: 2017-11-18

## 2017-11-15 RX ORDER — NIFEDIPINE 30 MG/1
60 TABLET, EXTENDED RELEASE ORAL DAILY
Status: DISCONTINUED | OUTPATIENT
Start: 2017-11-15 | End: 2017-11-15

## 2017-11-15 RX ORDER — NIFEDIPINE 60 MG/1
60 TABLET, EXTENDED RELEASE ORAL DAILY
Status: DISCONTINUED | OUTPATIENT
Start: 2017-11-15 | End: 2017-11-16

## 2017-11-15 RX ADMIN — GUAIFENESIN 600 MG: 600 TABLET, EXTENDED RELEASE ORAL at 16:00

## 2017-11-15 RX ADMIN — PREDNISONE 20 MG: 5 TABLET ORAL at 21:05

## 2017-11-15 RX ADMIN — OXYCODONE HYDROCHLORIDE AND ACETAMINOPHEN 1 TABLET: 5; 325 TABLET ORAL at 08:10

## 2017-11-15 RX ADMIN — FERROUS SULFATE TAB 325 MG (65 MG ELEMENTAL FE) 325 MG: 325 (65 FE) TAB at 16:00

## 2017-11-15 RX ADMIN — GUAIFENESIN 600 MG: 600 TABLET, EXTENDED RELEASE ORAL at 05:17

## 2017-11-15 RX ADMIN — NIFEDIPINE 60 MG: 30 TABLET, FILM COATED, EXTENDED RELEASE ORAL at 08:09

## 2017-11-15 RX ADMIN — PERFLUTREN 1 ML: 6.52 INJECTION, SUSPENSION INTRAVENOUS at 11:00

## 2017-11-15 RX ADMIN — NIFEDIPINE 60 MG: 60 TABLET, FILM COATED, EXTENDED RELEASE ORAL at 10:21

## 2017-11-15 RX ADMIN — LABETALOL HYDROCHLORIDE 200 MG: 200 TABLET, FILM COATED ORAL at 13:05

## 2017-11-15 RX ADMIN — IPRATROPIUM BROMIDE AND ALBUTEROL SULFATE 1 AMPULE: .5; 3 SOLUTION RESPIRATORY (INHALATION) at 18:47

## 2017-11-15 RX ADMIN — IPRATROPIUM BROMIDE AND ALBUTEROL SULFATE 1 AMPULE: .5; 3 SOLUTION RESPIRATORY (INHALATION) at 07:12

## 2017-11-15 RX ADMIN — FUROSEMIDE 40 MG: 10 INJECTION, SOLUTION INTRAMUSCULAR; INTRAVENOUS at 10:13

## 2017-11-15 RX ADMIN — FUROSEMIDE 80 MG: 10 INJECTION, SOLUTION INTRAMUSCULAR; INTRAVENOUS at 02:30

## 2017-11-15 RX ADMIN — GUAIFENESIN 600 MG: 600 TABLET, EXTENDED RELEASE ORAL at 10:13

## 2017-11-15 RX ADMIN — DOCUSATE SODIUM 100 MG: 100 CAPSULE, LIQUID FILLED ORAL at 21:06

## 2017-11-15 RX ADMIN — LABETALOL HYDROCHLORIDE 200 MG: 200 TABLET, FILM COATED ORAL at 16:00

## 2017-11-15 RX ADMIN — IPRATROPIUM BROMIDE AND ALBUTEROL SULFATE 1 AMPULE: .5; 3 SOLUTION RESPIRATORY (INHALATION) at 14:16

## 2017-11-15 RX ADMIN — IPRATROPIUM BROMIDE AND ALBUTEROL SULFATE 1 AMPULE: .5; 3 SOLUTION RESPIRATORY (INHALATION) at 11:12

## 2017-11-15 RX ADMIN — IPRATROPIUM BROMIDE AND ALBUTEROL SULFATE 1 AMPULE: .5; 3 SOLUTION RESPIRATORY (INHALATION) at 22:41

## 2017-11-15 RX ADMIN — FUROSEMIDE 40 MG: 40 TABLET ORAL at 08:10

## 2017-11-15 RX ADMIN — IPRATROPIUM BROMIDE AND ALBUTEROL SULFATE 1 AMPULE: .5; 3 SOLUTION RESPIRATORY (INHALATION) at 00:41

## 2017-11-15 RX ADMIN — FUROSEMIDE 40 MG: 10 INJECTION, SOLUTION INTRAMUSCULAR; INTRAVENOUS at 17:52

## 2017-11-15 RX ADMIN — DOCUSATE SODIUM 100 MG: 100 CAPSULE, LIQUID FILLED ORAL at 08:10

## 2017-11-15 RX ADMIN — FLUOXETINE 40 MG: 20 CAPSULE ORAL at 08:10

## 2017-11-15 RX ADMIN — OXYCODONE HYDROCHLORIDE AND ACETAMINOPHEN 2 TABLET: 5; 325 TABLET ORAL at 15:21

## 2017-11-15 RX ADMIN — PREDNISONE 20 MG: 5 TABLET ORAL at 08:10

## 2017-11-15 RX ADMIN — IBUPROFEN 800 MG: 400 TABLET, FILM COATED ORAL at 08:09

## 2017-11-15 RX ADMIN — LABETALOL HYDROCHLORIDE 200 MG: 200 TABLET, FILM COATED ORAL at 05:17

## 2017-11-15 RX ADMIN — FERROUS SULFATE TAB 325 MG (65 MG ELEMENTAL FE) 325 MG: 325 (65 FE) TAB at 08:10

## 2017-11-15 RX ADMIN — LABETALOL HYDROCHLORIDE 200 MG: 200 TABLET, FILM COATED ORAL at 21:06

## 2017-11-15 ASSESSMENT — PAIN SCALES - GENERAL
PAINLEVEL_OUTOF10: 3
PAINLEVEL_OUTOF10: 8
PAINLEVEL_OUTOF10: 0
PAINLEVEL_OUTOF10: 8

## 2017-11-15 ASSESSMENT — ENCOUNTER SYMPTOMS
WHEEZING: 1
SHORTNESS OF BREATH: 1
ABDOMINAL PAIN: 1
EYES NEGATIVE: 1
COUGH: 1

## 2017-11-15 ASSESSMENT — PAIN DESCRIPTION - LOCATION: LOCATION: ABDOMEN;BACK

## 2017-11-15 NOTE — CONSULTS
Pulmonary and Critical Care Consult Note  [address] [facility]    MR# [id number]    Acct# [de-identified]  11/15/2017   10:57 AM    Referring Provider: Len Choudhary MD    Chief Complaint: Dyspnea    HPI: We have been consulted to see this 30y.o. year old female born on 1980. Patient delivered a baby boy via  5 days ago on 11-10-17. She was on the postpartum floor and had respiratory distress and moved to ICU. It was reported to me that the patient was under stress from some family problems and had a panic attack that resulted in wheezing and respiratory distress. She is now on CPAP with 15 liters O2. CXR shows pulmonary edema, ARDs cannot be ruled out. She received 1 unit of PRBC post op. She is diuresing well from Lasix, 80 mg. She is fatigued but alert and oriented. She's had pre-eclampsia and continues to have hypertension. She has a history of asthma and was smoking up to her pregnancy. She takes some form of albuterol at home.    Past Medical History      Past Medical History:   Diagnosis Date    Acute blood loss anemia 2017    Asthma     Depression     Generalized anxiety disorder     Herpes simplex virus (HSV) infection     mouth sores    Hypertension     Sensitive skin     Has issues using fabric softeners and certain soaps     Surgical History  Past Surgical History:   Procedure Laterality Date     SECTION N/A 11/10/2017     SECTION performed by Len Choudhary MD at [address] [place] [address] L&D OR     SECTION, LOW TRANSVERSE       Allergies  Allergies   Allergen Reactions    Vinyl Ether      Medications    furosemide 40 mg Intravenous BID   NIFEdipine 60 mg Oral Daily   guaiFENesin 600 mg Oral Q6H   predniSONE 20 mg Oral BID   ipratropium-albuterol 1 ampule Inhalation Q4H   labetalol 200 mg Oral Q6H   sodium chloride 250 mL Intravenous Once   sodium chloride 250 mL Intravenous Once   FLUoxetine 40 mg Oral Daily   ferrous sulfate 325 mg Oral BID  motion. Neck supple. Cardiovascular: Normal rate and regular rhythm. Pulmonary/Chest: Tachypnea noted. She has no decreased breath sounds. She has no wheezes. She has rhonchi. She has no rales. On CPAP   Abdominal: Soft. Bowel sounds are normal.   Genitourinary:   Genitourinary Comments: Avila in place with clear, yellow urine   Musculoskeletal: She exhibits edema (lower extremities ). Neurological: She is oriented to person, place, and time and easily aroused. She appears lethargic. Skin: Skin is warm and dry. Psychiatric: She has a normal mood and affect. Nursing note and vitals reviewed. Recent Labs      11/13/17   0533  11/13/17   2247  11/15/17   0708   WBC  8.5  12.4*  14.1*   RBC  2.70*  3.06*  3.31*   HGB  7.7*  8.8*  9.3*   HCT  24.0*  27.6*  29.2*   PLT  172  247  304   MCV  88.9  90.2  88.2   MCH  28.5  28.8  28.1   MCHC  32.1*  31.9*  31.8*   RDW  16.2*  15.9*  15.9*      Recent Labs      11/13/17   1349  11/14/17   1541  11/15/17   1007   NA   --   139  142   K  4.0  4.7  4.0   CL   --   102  103   CO2   --   25  27   BUN   --   17  21*   CREATININE   --   0.9  0.8   CALCIUM   --   9.3  8.7   GLUCOSE   --   130*  122*      Recent Labs      11/13/17   1349   PHART  7.420   AXS5RTD  38.0   PO2ART  79.0*   KRE2OUM  24.6   O1PJICJT  95.7   BEART  0.2     Recent Labs      11/15/17   1007   CALCIUM  8.7     No results for input(s): BC, LABGRAM, CULTRESP, BFCX in the last 72 hours. Radiograph:  11/15/17 5986    Narrative:     Exam:   XR CHEST PORTABLE    Date:  11/15/2017   History:  Female, age  30 years; dyspnea  COMPARISON:  Chest x-ray dated 11/14/2017. Findings : The heart and mediastinum are unchanged in size. Bilateral confluent  opacities, right greater than left have increased. No measurable  pneumothorax.  The bones show no acute pathology.     Impression:     Impression:  Increased bilateral opacities, may reflect worsening pulmonary edema.   Underlying infectious process is not blood products and her preeclampsia. We'll switch her CPAP to BiPAP and will continue diuretic therapy. I have seen and examined patient personally, performing a face-to-face diagnostic evaluation with plan of care reviewed and developed with APRN and nursing staff. I have addended and/or modified the above history of present illness, physical examination, and assessment and plan to reflect my findings and impressions. Essential elements of the care plan were discussed with APRN above. Agree with findings and assessment/plan as documented above.     Electronically signed by Liliana York MD on 11/15/17 at 12:27 PM

## 2017-11-15 NOTE — PROGRESS NOTES
5300 ml   Net            -5300 ml     Net I/O since admission (-7.43L)    General appearance: alert and cooperative with exam  HEENT: atraumatic, eyes with clear conjunctiva and normal lids, pupils and irises normal, external ears and nose are normal, lips normal. Neck without masses, lympadenopathy, bruit, thyroid normal  Lungs: no increased work of breathing, clear to auscultation bilaterally without rales, rhonchi or wheezes  Heart: regular rate and rhythm, S1, S2 normal, no murmur, click, rub or gallop  Abdomen: soft, non-tender; bowel sounds normal; no masses,  no organomegaly  Extremities: extremities normal, atraumatic, no cyanosis or edema  Neurologic: No focal neurologic deficits, normal sensation, alert and oriented, affect and mood appropriate.   Skin: no rashes, nodules    Medications:      oxytocin 166 edgard-units/min (11/10/17 2051)    oxytocin      oxytocin Stopped (11/11/17 0655)      furosemide  40 mg Oral BID    guaiFENesin  600 mg Oral Q6H    predniSONE  20 mg Oral BID    ipratropium-albuterol  1 ampule Inhalation Q4H    NIFEdipine  30 mg Oral Daily    labetalol  200 mg Oral Q6H    sodium chloride  250 mL Intravenous Once    sodium chloride  250 mL Intravenous Once    FLUoxetine  40 mg Oral Daily    ferrous sulfate  325 mg Oral BID WC    docusate sodium  100 mg Oral BID     Tetanus-Diphth-Acell Pertussis  0.5 mL Intramuscular Prior to discharge     albuterol, diphenhydrAMINE, ibuprofen, oxyCODONE-acetaminophen, acetaminophen, ondansetron, lidocaine PF, butorphanol, bisacodyl, ondansetron, oxytocin, acetaminophen, lanolin, oxyCODONE-acetaminophen  DIET GENERAL;     Lab and other Data:     Recent Labs      11/12/17   1542  11/13/17   0533  11/13/17   2247   WBC  9.7  8.5  12.4*   HGB  8.1*  7.7*  8.8*   PLT  184  172  247     Recent Labs      11/13/17   1349  11/14/17   1541   NA   --   139   K  4.0  4.7   CL   --   102   CO2   --   25   BUN   --   17   CREATININE   --   0.9

## 2017-11-15 NOTE — PROGRESS NOTES
--   9.3      Recent Labs      11/14/17   1541   PROBNP  571*          Assessment:     30 y.o. female with acute respiratory failure, HTN urgency    Plan:     - move to ICU  - consult pulmonary  - additional IV lasix this AM  - with the diuresis of 7L with her cxr looking poor, I am concerned of ARDS.  Ask pulmonary  - COntinue bipap  - insert betancourt, strict I/Os  - increase nifedipine to 60mg daily    Rashida Coleman MD  [facility]    11/15/2017 8:31 AM

## 2017-11-15 NOTE — PROGRESS NOTES
Patient complained of increased shortness of breath and coughing up pink tinged fluid with cough. Patient up in chair with oxygen laying in her lap. O2 saturation 70% ON ROOM AIR. ELIZABETH I-MASK REAPPLIED. SAT UP TO 84% with audible rhonchi/wheezes. Respiratory therapy called to come to bedside and  hospitalist paged to come to patients room.

## 2017-11-15 NOTE — PROGRESS NOTES
Sitting up on bedside commode. respirations unlabored at this time. Respirations much improved along with lung fields per auscultation. 02 remains 97%.

## 2017-11-15 NOTE — PROGRESS NOTES
Called to bedside pt-Sp02 0n 35% venti mask 78% inceased fio2 to 50% pt sp02 89% pt had neb tx 40 minutes ago

## 2017-11-15 NOTE — PROGRESS NOTES
and greta the emergency contact are in the room talking with pt regarding discharge of baby. She wants greta to be in charge of baby and her decision maker at this time. Making pt a privacy pt due to previous issues that have happened this hospitalization.

## 2017-11-15 NOTE — PROGRESS NOTES
Called [facility] at 09:40 to notify of consult. Kelsey notified me that Dr. Page Echevarria is on call.  Consult was added to his list.

## 2017-11-15 NOTE — PROGRESS NOTES
hospitalist at bedside . Lasix 80mg order received. IV restarted to left wrist.. c-pap ordered, resp therapy remains at bedside.

## 2017-11-16 ENCOUNTER — APPOINTMENT (OUTPATIENT)
Dept: GENERAL RADIOLOGY | Age: 37
End: 2017-11-16
Attending: OBSTETRICS & GYNECOLOGY
Payer: MEDICAID

## 2017-11-16 LAB
ALBUMIN SERPL-MCNC: 2.8 G/DL (ref 3.5–5.2)
ALP BLD-CCNC: 114 U/L (ref 35–104)
ALT SERPL-CCNC: 31 U/L (ref 5–33)
ANION GAP SERPL CALCULATED.3IONS-SCNC: 11 MMOL/L (ref 7–19)
AST SERPL-CCNC: 21 U/L (ref 5–32)
BASOPHILS ABSOLUTE: 0 K/UL (ref 0–0.2)
BASOPHILS RELATIVE PERCENT: 0.2 % (ref 0–1)
BILIRUB SERPL-MCNC: 0.3 MG/DL (ref 0.2–1.2)
BUN BLDV-MCNC: 18 MG/DL (ref 6–20)
CALCIUM SERPL-MCNC: 8.6 MG/DL (ref 8.6–10)
CHLORIDE BLD-SCNC: 101 MMOL/L (ref 98–111)
CO2: 31 MMOL/L (ref 22–29)
CREAT SERPL-MCNC: 0.7 MG/DL (ref 0.5–0.9)
EOSINOPHILS ABSOLUTE: 0.1 K/UL (ref 0–0.6)
EOSINOPHILS RELATIVE PERCENT: 1.1 % (ref 0–5)
GFR NON-AFRICAN AMERICAN: >60
GLUCOSE BLD-MCNC: 112 MG/DL (ref 74–109)
HCT VFR BLD CALC: 28.2 % (ref 37–47)
HEMOGLOBIN: 8.8 G/DL (ref 12–16)
LYMPHOCYTES ABSOLUTE: 1.5 K/UL (ref 1.1–4.5)
LYMPHOCYTES RELATIVE PERCENT: 15.4 % (ref 20–40)
MCH RBC QN AUTO: 27.9 PG (ref 27–31)
MCHC RBC AUTO-ENTMCNC: 31.2 G/DL (ref 33–37)
MCV RBC AUTO: 89.5 FL (ref 81–99)
MONOCYTES ABSOLUTE: 0.4 K/UL (ref 0–0.9)
MONOCYTES RELATIVE PERCENT: 4.2 % (ref 0–10)
NEUTROPHILS ABSOLUTE: 7.2 K/UL (ref 1.5–7.5)
NEUTROPHILS RELATIVE PERCENT: 76.5 % (ref 50–65)
PDW BLD-RTO: 15.9 % (ref 11.5–14.5)
PLATELET # BLD: 297 K/UL (ref 130–400)
PMV BLD AUTO: 9.8 FL (ref 9.4–12.3)
POTASSIUM SERPL-SCNC: 3.7 MMOL/L (ref 3.5–5)
RBC # BLD: 3.15 M/UL (ref 4.2–5.4)
SODIUM BLD-SCNC: 143 MMOL/L (ref 136–145)
TOTAL PROTEIN: 5.9 G/DL (ref 6.6–8.7)
WBC # BLD: 9.5 K/UL (ref 4.8–10.8)

## 2017-11-16 PROCEDURE — 99231 SBSQ HOSP IP/OBS SF/LOW 25: CPT | Performed by: INTERNAL MEDICINE

## 2017-11-16 PROCEDURE — 6370000000 HC RX 637 (ALT 250 FOR IP): Performed by: NURSE PRACTITIONER

## 2017-11-16 PROCEDURE — 80053 COMPREHEN METABOLIC PANEL: CPT

## 2017-11-16 PROCEDURE — 2500000003 HC RX 250 WO HCPCS: Performed by: HOSPITALIST

## 2017-11-16 PROCEDURE — 2700000000 HC OXYGEN THERAPY PER DAY

## 2017-11-16 PROCEDURE — 71010 XR CHEST PORTABLE: CPT

## 2017-11-16 PROCEDURE — 6370000000 HC RX 637 (ALT 250 FOR IP)

## 2017-11-16 PROCEDURE — 6370000000 HC RX 637 (ALT 250 FOR IP): Performed by: HOSPITALIST

## 2017-11-16 PROCEDURE — 94640 AIRWAY INHALATION TREATMENT: CPT

## 2017-11-16 PROCEDURE — 6370000000 HC RX 637 (ALT 250 FOR IP): Performed by: OBSTETRICS & GYNECOLOGY

## 2017-11-16 PROCEDURE — 2000000000 HC ICU R&B

## 2017-11-16 PROCEDURE — 36415 COLL VENOUS BLD VENIPUNCTURE: CPT

## 2017-11-16 PROCEDURE — 94660 CPAP INITIATION&MGMT: CPT

## 2017-11-16 PROCEDURE — 85025 COMPLETE CBC W/AUTO DIFF WBC: CPT

## 2017-11-16 PROCEDURE — 6370000000 HC RX 637 (ALT 250 FOR IP): Performed by: INTERNAL MEDICINE

## 2017-11-16 PROCEDURE — 6360000002 HC RX W HCPCS: Performed by: INTERNAL MEDICINE

## 2017-11-16 PROCEDURE — 99233 SBSQ HOSP IP/OBS HIGH 50: CPT | Performed by: HOSPITALIST

## 2017-11-16 RX ORDER — PREDNISONE 20 MG/1
20 TABLET ORAL
Status: DISCONTINUED | OUTPATIENT
Start: 2017-11-16 | End: 2017-11-19

## 2017-11-16 RX ORDER — LISINOPRIL 20 MG/1
40 TABLET ORAL DAILY
Status: DISCONTINUED | OUTPATIENT
Start: 2017-11-16 | End: 2017-11-16

## 2017-11-16 RX ORDER — METOPROLOL TARTRATE 50 MG/1
100 TABLET, FILM COATED ORAL 2 TIMES DAILY
Status: DISCONTINUED | OUTPATIENT
Start: 2017-11-16 | End: 2017-11-17

## 2017-11-16 RX ORDER — NIFEDIPINE 60 MG/1
60 TABLET, EXTENDED RELEASE ORAL 2 TIMES DAILY
Status: DISCONTINUED | OUTPATIENT
Start: 2017-11-16 | End: 2017-11-20 | Stop reason: HOSPADM

## 2017-11-16 RX ORDER — HYDRALAZINE HYDROCHLORIDE 25 MG/1
25 TABLET, FILM COATED ORAL EVERY 8 HOURS SCHEDULED
Status: DISCONTINUED | OUTPATIENT
Start: 2017-11-16 | End: 2017-11-16

## 2017-11-16 RX ORDER — LORAZEPAM 0.5 MG/1
0.5 TABLET ORAL EVERY 4 HOURS PRN
Status: DISCONTINUED | OUTPATIENT
Start: 2017-11-16 | End: 2017-11-20 | Stop reason: HOSPADM

## 2017-11-16 RX ORDER — PROMETHAZINE HYDROCHLORIDE 25 MG/ML
25 INJECTION, SOLUTION INTRAMUSCULAR; INTRAVENOUS EVERY 4 HOURS PRN
Status: DISCONTINUED | OUTPATIENT
Start: 2017-11-16 | End: 2017-11-20 | Stop reason: HOSPADM

## 2017-11-16 RX ORDER — METOPROLOL TARTRATE 5 MG/5ML
5 INJECTION INTRAVENOUS EVERY 6 HOURS PRN
Status: DISCONTINUED | OUTPATIENT
Start: 2017-11-16 | End: 2017-11-19

## 2017-11-16 RX ORDER — METOPROLOL TARTRATE 50 MG/1
50 TABLET, FILM COATED ORAL 2 TIMES DAILY
Status: DISCONTINUED | OUTPATIENT
Start: 2017-11-16 | End: 2017-11-16

## 2017-11-16 RX ORDER — METOPROLOL TARTRATE 50 MG/1
100 TABLET, FILM COATED ORAL 2 TIMES DAILY
Status: DISCONTINUED | OUTPATIENT
Start: 2017-11-16 | End: 2017-11-16

## 2017-11-16 RX ORDER — CLONIDINE HYDROCHLORIDE 0.1 MG/1
0.1 TABLET ORAL EVERY 4 HOURS PRN
Status: DISCONTINUED | OUTPATIENT
Start: 2017-11-16 | End: 2017-11-19

## 2017-11-16 RX ADMIN — GUAIFENESIN 600 MG: 600 TABLET, EXTENDED RELEASE ORAL at 10:15

## 2017-11-16 RX ADMIN — IPRATROPIUM BROMIDE AND ALBUTEROL SULFATE 1 AMPULE: .5; 3 SOLUTION RESPIRATORY (INHALATION) at 22:33

## 2017-11-16 RX ADMIN — METOPROLOL TARTRATE 100 MG: 50 TABLET ORAL at 21:28

## 2017-11-16 RX ADMIN — FUROSEMIDE 40 MG: 10 INJECTION, SOLUTION INTRAMUSCULAR; INTRAVENOUS at 09:03

## 2017-11-16 RX ADMIN — NIFEDIPINE 60 MG: 60 TABLET, FILM COATED, EXTENDED RELEASE ORAL at 17:41

## 2017-11-16 RX ADMIN — METOPROLOL TARTRATE 100 MG: 50 TABLET ORAL at 10:15

## 2017-11-16 RX ADMIN — OXYCODONE HYDROCHLORIDE AND ACETAMINOPHEN 2 TABLET: 5; 325 TABLET ORAL at 21:50

## 2017-11-16 RX ADMIN — IPRATROPIUM BROMIDE AND ALBUTEROL SULFATE 1 AMPULE: .5; 3 SOLUTION RESPIRATORY (INHALATION) at 06:40

## 2017-11-16 RX ADMIN — IPRATROPIUM BROMIDE AND ALBUTEROL SULFATE 1 AMPULE: .5; 3 SOLUTION RESPIRATORY (INHALATION) at 14:18

## 2017-11-16 RX ADMIN — FLUOXETINE 40 MG: 20 CAPSULE ORAL at 09:03

## 2017-11-16 RX ADMIN — OXYCODONE HYDROCHLORIDE AND ACETAMINOPHEN 2 TABLET: 5; 325 TABLET ORAL at 15:27

## 2017-11-16 RX ADMIN — IPRATROPIUM BROMIDE AND ALBUTEROL SULFATE 1 AMPULE: .5; 3 SOLUTION RESPIRATORY (INHALATION) at 10:12

## 2017-11-16 RX ADMIN — DOCUSATE SODIUM 100 MG: 100 CAPSULE, LIQUID FILLED ORAL at 09:03

## 2017-11-16 RX ADMIN — IPRATROPIUM BROMIDE AND ALBUTEROL SULFATE 1 AMPULE: .5; 3 SOLUTION RESPIRATORY (INHALATION) at 18:29

## 2017-11-16 RX ADMIN — DOCUSATE SODIUM 100 MG: 100 CAPSULE, LIQUID FILLED ORAL at 21:27

## 2017-11-16 RX ADMIN — LORAZEPAM 0.5 MG: 0.5 TABLET ORAL at 16:06

## 2017-11-16 RX ADMIN — GUAIFENESIN 600 MG: 600 TABLET, EXTENDED RELEASE ORAL at 05:41

## 2017-11-16 RX ADMIN — NIFEDIPINE 60 MG: 60 TABLET, FILM COATED, EXTENDED RELEASE ORAL at 09:03

## 2017-11-16 RX ADMIN — IPRATROPIUM BROMIDE AND ALBUTEROL SULFATE 1 AMPULE: .5; 3 SOLUTION RESPIRATORY (INHALATION) at 02:13

## 2017-11-16 RX ADMIN — FERROUS SULFATE TAB 325 MG (65 MG ELEMENTAL FE) 325 MG: 325 (65 FE) TAB at 09:04

## 2017-11-16 RX ADMIN — GUAIFENESIN 600 MG: 600 TABLET, EXTENDED RELEASE ORAL at 21:27

## 2017-11-16 RX ADMIN — METOROPROLOL TARTRATE 5 MG: 5 INJECTION, SOLUTION INTRAVENOUS at 16:08

## 2017-11-16 RX ADMIN — OXYCODONE HYDROCHLORIDE AND ACETAMINOPHEN 1 TABLET: 5; 325 TABLET ORAL at 01:40

## 2017-11-16 RX ADMIN — LABETALOL HYDROCHLORIDE 200 MG: 200 TABLET, FILM COATED ORAL at 05:41

## 2017-11-16 RX ADMIN — PREDNISONE 20 MG: 5 TABLET ORAL at 09:04

## 2017-11-16 RX ADMIN — HYDRALAZINE HYDROCHLORIDE 25 MG: 25 TABLET, FILM COATED ORAL at 07:48

## 2017-11-16 RX ADMIN — PREDNISONE 20 MG: 20 TABLET ORAL at 16:06

## 2017-11-16 RX ADMIN — FERROUS SULFATE TAB 325 MG (65 MG ELEMENTAL FE) 325 MG: 325 (65 FE) TAB at 16:06

## 2017-11-16 RX ADMIN — FUROSEMIDE 40 MG: 10 INJECTION, SOLUTION INTRAMUSCULAR; INTRAVENOUS at 17:41

## 2017-11-16 RX ADMIN — GUAIFENESIN 600 MG: 600 TABLET, EXTENDED RELEASE ORAL at 01:39

## 2017-11-16 RX ADMIN — GUAIFENESIN 600 MG: 600 TABLET, EXTENDED RELEASE ORAL at 16:06

## 2017-11-16 ASSESSMENT — PAIN DESCRIPTION - FREQUENCY: FREQUENCY: INTERMITTENT

## 2017-11-16 ASSESSMENT — PAIN SCALES - GENERAL
PAINLEVEL_OUTOF10: 2
PAINLEVEL_OUTOF10: 0
PAINLEVEL_OUTOF10: 3
PAINLEVEL_OUTOF10: 0
PAINLEVEL_OUTOF10: 0
PAINLEVEL_OUTOF10: 6
PAINLEVEL_OUTOF10: 0
PAINLEVEL_OUTOF10: 0
PAINLEVEL_OUTOF10: 5
PAINLEVEL_OUTOF10: 7
PAINLEVEL_OUTOF10: 0
PAINLEVEL_OUTOF10: 0

## 2017-11-16 ASSESSMENT — PAIN DESCRIPTION - LOCATION: LOCATION: ABDOMEN

## 2017-11-16 ASSESSMENT — PAIN DESCRIPTION - PROGRESSION: CLINICAL_PROGRESSION: GRADUALLY WORSENING

## 2017-11-16 ASSESSMENT — PAIN DESCRIPTION - DESCRIPTORS: DESCRIPTORS: DISCOMFORT;SORE;TENDER

## 2017-11-16 ASSESSMENT — PAIN DESCRIPTION - ONSET: ONSET: GRADUAL

## 2017-11-16 ASSESSMENT — PAIN DESCRIPTION - PAIN TYPE: TYPE: SURGICAL PAIN

## 2017-11-16 NOTE — PROGRESS NOTES
Nutrition Assessment    Type and Reason for Visit: Initial    Nutrition Recommendations: continue current POC    Malnutrition Assessment:  · Malnutrition Status: No malnutrition    Nutrition Diagnosis:   · Problem: Increased nutrient needs  · Etiology: related to Increased demand for energy/nutrients due to     Signs and symptoms:  as evidenced by Presence of wounds (recent childbirth)    Nutrition Assessment:  · Subjective Assessment: Follow for LOS x 7 days. Pt is receiving a Regular diet. Has just received breakfast this a.m. · Nutrition-Focused Physical Findings: well nourished appearing female on CPaP  · Wound Type: Surgical Wound  · Current Nutrition Therapies:  · Oral Diet Orders: General   · Oral Diet intake: Unable to assess  · Oral Nutrition Supplement (ONS) Orders: None    · Anthropometric Measures:  · Ht: 5' 2\" (157.5 cm)   · Current Body Wt: 240 lb 14 oz (109.3 kg) Post pregnancy weight  · Admission Body Wt:    · Ideal Body Wt: 110 lb (49.9 kg),  ·  BMI Classification: BMI > or equal to 40.0 Obese Class III    Estimated Intake vs Estimated Needs: Insufficient Data    Nutrition Risk Level: High    Nutrition Interventions:   Continue current diet  Continued Inpatient Monitoring    Nutrition Evaluation:   · Evaluation: Goals set   · Goals: meet nutritional needs through po intake    · Monitoring: Meal Intake, Diet Tolerance, Skin Integrity, Wound Healing, Weight, Pertinent Labs    See Adult Nutrition Doc Flowsheet for more detail.      Electronically signed by Lloyd Dumont MS, RD, LD on 11/16/17 at 9:56 AM    Contact Number: [phone]

## 2017-11-16 NOTE — PROGRESS NOTES
Postpartum Day 6:  Delivery    Patient is a  that delivered on 11/10/17. The patient ia in ICU for continues respiratory distress. She states she feels some improvement today. Being co-managed with cardiology, pulmonology and hospitalist.  The patient denies emotional concerns. Pain is well controlled with current medications. The baby is well. Baby is feeding via bottle - [place] Good Start. Urinary output is adequate via betancourt. The patient is ambulating well. The patient is tolerating a liquid diet. Flatus has been passed. Objective:      Patient Vitals for the past 8 hrs:  Patient Vitals for the past 8 hrs:   BP Temp Temp src Pulse Resp SpO2 Height Weight   17 1000 (!) 163/79 - - 87 27 97 % - -   17 0945 - - - 87 27 97 % - -   17 0930 (!) 154/84 - - 93 18 99 % - -   17 0915 (!) 171/91 - - 87 23 97 % - -   17 0900 (!) 185/93 - - 81 29 95 % - -   17 0830 (!) 170/91 - - 82 21 97 % - -   17 0800 (!) 158/85 98.9 °F (37.2 °C) Temporal 79 13 99 % - -   17 0730 (!) 160/89 - - 80 26 96 % - -   17 0700 (!) 158/85 - - 84 28 97 % - -   17 0615 - - - 86 - - - -   17 0600 (!) 174/100 98.7 °F (37.1 °C) - 91 26 95 % - -   17 0500 - - - 87 (!) 34 97 % - -   17 0400 (!) 166/85 98.6 °F (37 °C) - 92 25 95 % 5' 2\" (1.575 m) 240 lb 14.4 oz (109.3 kg)       General:    alert, appears stated age and cooperative   Bowel Sounds:  active   Lochia:  appropriate   Uterine Fundus:   firm   Incision:  healing well, no significant drainage, no dehiscence, no significant erythema   DVT Evaluation:  No evidence of DVT seen on physical exam.  Lungs: clear to inspiration, diminished in bilateral lower lobes. Lab Results   Component Value Date    WBC 9.5 2017    HGB 8.8 (L) 2017    HCT 28.2 (L) 2017    MCV 89.5 2017     2017     Assessment:     Status post  section.  Complicated by pulmonary edema and

## 2017-11-16 NOTE — PROGRESS NOTES
[facility]      Patient:  Alissa Saleem  YOB: 1980  Date of Service: 2017  MRN: [id number]   Acct: [de-identified]   Primary Care Physician: Star Barbour MD  Advance Directive: Prior  Admit Date: 11/10/2017       Hospital Day: 6    CHIEF COMPLAINT Dyspnea improved, she is relaxed, less anxious, feeling better. SUBJECTIVE: Comfortable on CPAP and reclining flat, no CP or SOA per patient, stress reduced (see CC note 11/15/17). Still SOA, added CPAP 5 and 15L O2 and this has proven, no other complaint. Cumulative Hospital Course: The patient is a 30 y.o. female who presents 36W2D with chronic hypertension on labetalol. Hypertension led to early  11/10/17. Normal post operative course, did require 1 unit PRBC to be infused. She began the day 17  with a \"panic attack\".  reports her mother has been present and causing a lot of stress in the room. Her legs are swollen. Suddently she developed acute shortness of breath and saturation was at 70%. She received a proventil treatment, 20mg lasix IV and oxygen with venti-mask. Saturations now at 94%. She has nonlabored breathing, appears tired but received percocet recently. She had mild intermittent asthma prior to admission and only occasionally had to use proventil. Lasix, solumedrol, magnesium and echocardiogram have been ordered. ABG pH 7.42 CO2 38 O2 79. Transferred to ICU 11/15/16 with SOA, respiratory distress and placed on O2 and CPAP with improvement, CXR: ARDS. Echocardiogram revealed EF 45-50%. She was continued on Oral Lasix , Mucinex, and Solumedrol was changed to oral prednisone. 14 point review of systems is negative except as specifically addressed above. Objective:   VITALS:  BP (!) 163/79   Pulse 87   Temp 98.9 °F (37.2 °C) (Temporal)   Resp 27   Ht 5' 2\" (1.575 m)   Wt 240 lb 14.4 oz (109.3 kg)   LMP 2017 (Approximate)   SpO2 97%   Breastfeeding?  Unknown   BMI 44.06 kg/m²   24HR INTAKE/OUTPUT:      Intake/Output Summary (Last 24 hours) at 11/16/17 1056  Last data filed at 11/16/17 1000   Gross per 24 hour   Intake              500 ml   Output             4445 ml   Net            -3945 ml     Net I/O since admission (-11.68L)    General appearance: alert and cooperative with exam, MUCH more relaxed, comfortable and calm  HEENT: atraumatic, eyes with clear conjunctiva and normal lids, pupils and irises normal, external ears and nose are normal, lips normal. Neck without masses, lympadenopathy, bruit, thyroid normal  Lungs: no increased work of breathing, clear to auscultation bilaterally without rales, rhonchi or wheezes  Heart: regular rate and rhythm, S1, S2 normal, no murmur, click, rub or gallop  Abdomen: soft, non-tender; bowel sounds normal; no masses,  no organomegaly  Extremities: extremities normal, atraumatic, no cyanosis or edema  Neurologic: No focal neurologic deficits, normal sensation, alert and oriented, affect and mood appropriate.   Skin: no rashes, nodules    Medications:      oxytocin 166 edgard-units/min (11/10/17 2051)    oxytocin      oxytocin Stopped (11/11/17 0655)      metoprolol tartrate  100 mg Oral BID    furosemide  40 mg Intravenous BID    NIFEdipine  60 mg Oral Daily    guaiFENesin  600 mg Oral Q6H    predniSONE  20 mg Oral BID    ipratropium-albuterol  1 ampule Inhalation Q4H    sodium chloride  250 mL Intravenous Once    sodium chloride  250 mL Intravenous Once    FLUoxetine  40 mg Oral Daily    ferrous sulfate  325 mg Oral BID WC    docusate sodium  100 mg Oral BID     Tetanus-Diphth-Acell Pertussis  0.5 mL Intramuscular Prior to discharge     metoprolol, albuterol, diphenhydrAMINE, ibuprofen, oxyCODONE-acetaminophen, acetaminophen, ondansetron, lidocaine PF, butorphanol, bisacodyl, ondansetron, oxytocin, acetaminophen, lanolin, oxyCODONE-acetaminophen  DIET GENERAL;     Lab and other Data:     Recent Labs      11/13/17   6661 11/15/17   0708  11/16/17   0357   WBC  12.4*  14.1*  9.5   HGB  8.8*  9.3*  8.8*   PLT  247  304  297     Recent Labs      11/15/17   1007  11/15/17   1100  11/16/17   0357   NA  142  141  143   K  4.0  3.9  3.7   CL  103  100  101   CO2  27  29  31*   BUN  21*  21*  18   CREATININE  0.8  0.8  0.7   GLUCOSE  122*  120*  112*     ABGs:   Lab Results   Component Value Date    PHART 7.420 11/13/2017    PO2ART 79.0 11/13/2017    GXJ0QUE 38.0 11/13/2017       CXR 11/16/2017  Impression:  1. Similar diffuse bilateral pulmonary opacities may represent edema and/or multifocal pneumonia. Follow-up recommended. .   Signed by Dr Merlyn Chavis on 11/16/2017 7:59 AM    Echo: 11/13/2017  Mild concentric left ventricular hypertrophy.   Left ventricular size is normal .   Borderline ejection fraction with an ejection fraction of 45-50%    Venous US 11/14/17  Impression    Normal venous duplex study of the bilateral lower extremity(ies). There is no evidence of deep or superficial venous thrombosis.     Electronically signed by Paulina Raya MD    Micro: Urine culture no growth    Assessment/Plan   Principle Problem:    Acute Respiratory failure - volume overload improved, c/w ARDS. HCAP unlikely, responding to diuresis / CPAP / O2. Active problems:    Severe persistent Asthma exacerbation - Duonebs, mucinex, prednisone PO, improving    Preeclampsia, third trimester - metoprolol, nifedipine; BP improved but fluctuating after delivery, not breast feeding    Leukocytosis - worsening likely 2' steroids, PNA considered less likely    Acute blood loss anemia - monitor; stable after 1 unit PRBCs     MDM: She is improved with diuresis, steroids, nebulizers, clinically compatible with ARDS; contributors may include pre-eclampsia, blood transfusion, other. She is much improved but needs to stay in ICU, adjust BP meds, monitor renal function, Hg, so far so good.  May be able to return to [facility] 1-2 days, wean BIPAP / O2 as able - sat

## 2017-11-16 NOTE — PLAN OF CARE
Problem: Anxiety:  Goal: Level of anxiety will decrease  Level of anxiety will decrease   Outcome: Ongoing      Problem: Falls - Risk of  Goal: Absence of falls  Outcome: Ongoing      Problem: Fluid Volume:  Goal: Hemodynamic stability will improve  Hemodynamic stability will improve   Outcome: Ongoing    Goal: Ability to maintain a balanced intake and output will improve  Ability to maintain a balanced intake and output will improve   Outcome: Ongoing      Problem: Respiratory:  Goal: Respiratory status will improve  Respiratory status will improve   Outcome: Ongoing      Problem: Pain:  Goal: Pain level will decrease  Pain level will decrease   Outcome: Ongoing

## 2017-11-16 NOTE — PROGRESS NOTES
portion:  Subjective: The patient is clinically much improved. She currently is on O2 via nasal cannula with saturations in the mid 90s and is in no distress. Objective: She has improved air movement on chest exam. Her chest x-ray accounting for technique probably does not show any major change. Assessment/recommendation: We will continue her current regimen and again she is much improved clinically today. I have seen and examined patient personally, performing a face-to-face diagnostic evaluation with plan of care reviewed and developed with APRN and nursing staff. I have addended and/or modified the above history of present illness, physical examination, and assessment and plan to reflect my findings and impressions. Essential elements of the care plan were discussed with APRN above. Agree with findings and assessment/plan as documented above.     Electronically signed by Dora Cruz MD on 11/16/17 at 1:00 PM

## 2017-11-17 ENCOUNTER — APPOINTMENT (OUTPATIENT)
Dept: GENERAL RADIOLOGY | Age: 37
End: 2017-11-17
Attending: OBSTETRICS & GYNECOLOGY
Payer: MEDICAID

## 2017-11-17 LAB
ALBUMIN SERPL-MCNC: 3.1 G/DL (ref 3.5–5.2)
ALP BLD-CCNC: 134 U/L (ref 35–104)
ALT SERPL-CCNC: 29 U/L (ref 5–33)
ANION GAP SERPL CALCULATED.3IONS-SCNC: 11 MMOL/L (ref 7–19)
AST SERPL-CCNC: 18 U/L (ref 5–32)
BASOPHILS ABSOLUTE: 0 K/UL (ref 0–0.2)
BASOPHILS RELATIVE PERCENT: 0.2 % (ref 0–1)
BILIRUB SERPL-MCNC: 0.3 MG/DL (ref 0.2–1.2)
BUN BLDV-MCNC: 21 MG/DL (ref 6–20)
CALCIUM SERPL-MCNC: 9.1 MG/DL (ref 8.6–10)
CHLORIDE BLD-SCNC: 98 MMOL/L (ref 98–111)
CO2: 32 MMOL/L (ref 22–29)
CREAT SERPL-MCNC: 0.7 MG/DL (ref 0.5–0.9)
EOSINOPHILS ABSOLUTE: 0.1 K/UL (ref 0–0.6)
EOSINOPHILS RELATIVE PERCENT: 0.7 % (ref 0–5)
GFR NON-AFRICAN AMERICAN: >60
GLUCOSE BLD-MCNC: 107 MG/DL (ref 74–109)
HCT VFR BLD CALC: 32.5 % (ref 37–47)
HEMOGLOBIN: 10.3 G/DL (ref 12–16)
LYMPHOCYTES ABSOLUTE: 2.1 K/UL (ref 1.1–4.5)
LYMPHOCYTES RELATIVE PERCENT: 14.4 % (ref 20–40)
MCH RBC QN AUTO: 28 PG (ref 27–31)
MCHC RBC AUTO-ENTMCNC: 31.7 G/DL (ref 33–37)
MCV RBC AUTO: 88.3 FL (ref 81–99)
MONOCYTES ABSOLUTE: 0.9 K/UL (ref 0–0.9)
MONOCYTES RELATIVE PERCENT: 6.1 % (ref 0–10)
NEUTROPHILS ABSOLUTE: 11.2 K/UL (ref 1.5–7.5)
NEUTROPHILS RELATIVE PERCENT: 77 % (ref 50–65)
PDW BLD-RTO: 15.2 % (ref 11.5–14.5)
PLATELET # BLD: 352 K/UL (ref 130–400)
PMV BLD AUTO: 9.5 FL (ref 9.4–12.3)
POTASSIUM SERPL-SCNC: 3.5 MMOL/L (ref 3.5–5)
RBC # BLD: 3.68 M/UL (ref 4.2–5.4)
SODIUM BLD-SCNC: 141 MMOL/L (ref 136–145)
TOTAL PROTEIN: 6.2 G/DL (ref 6.6–8.7)
WBC # BLD: 14.5 K/UL (ref 4.8–10.8)

## 2017-11-17 PROCEDURE — 94640 AIRWAY INHALATION TREATMENT: CPT

## 2017-11-17 PROCEDURE — 6370000000 HC RX 637 (ALT 250 FOR IP): Performed by: HOSPITALIST

## 2017-11-17 PROCEDURE — 6360000002 HC RX W HCPCS: Performed by: INTERNAL MEDICINE

## 2017-11-17 PROCEDURE — 99233 SBSQ HOSP IP/OBS HIGH 50: CPT | Performed by: HOSPITALIST

## 2017-11-17 PROCEDURE — 6370000000 HC RX 637 (ALT 250 FOR IP): Performed by: OBSTETRICS & GYNECOLOGY

## 2017-11-17 PROCEDURE — 71010 XR CHEST PORTABLE: CPT

## 2017-11-17 PROCEDURE — 99231 SBSQ HOSP IP/OBS SF/LOW 25: CPT | Performed by: INTERNAL MEDICINE

## 2017-11-17 PROCEDURE — 6370000000 HC RX 637 (ALT 250 FOR IP): Performed by: INTERNAL MEDICINE

## 2017-11-17 PROCEDURE — 6370000000 HC RX 637 (ALT 250 FOR IP): Performed by: NURSE PRACTITIONER

## 2017-11-17 PROCEDURE — 99232 SBSQ HOSP IP/OBS MODERATE 35: CPT | Performed by: NURSE PRACTITIONER

## 2017-11-17 PROCEDURE — 6370000000 HC RX 637 (ALT 250 FOR IP)

## 2017-11-17 PROCEDURE — 85025 COMPLETE CBC W/AUTO DIFF WBC: CPT

## 2017-11-17 PROCEDURE — 2000000000 HC ICU R&B

## 2017-11-17 PROCEDURE — 2500000003 HC RX 250 WO HCPCS: Performed by: HOSPITALIST

## 2017-11-17 PROCEDURE — 6360000002 HC RX W HCPCS: Performed by: NURSE PRACTITIONER

## 2017-11-17 PROCEDURE — 2700000000 HC OXYGEN THERAPY PER DAY

## 2017-11-17 PROCEDURE — 36415 COLL VENOUS BLD VENIPUNCTURE: CPT

## 2017-11-17 PROCEDURE — 80053 COMPREHEN METABOLIC PANEL: CPT

## 2017-11-17 RX ORDER — BUDESONIDE 0.5 MG/2ML
0.5 INHALANT ORAL 2 TIMES DAILY
Status: DISCONTINUED | OUTPATIENT
Start: 2017-11-17 | End: 2017-11-19

## 2017-11-17 RX ORDER — CARVEDILOL 6.25 MG/1
6.25 TABLET ORAL 2 TIMES DAILY WITH MEALS
Status: DISCONTINUED | OUTPATIENT
Start: 2017-11-17 | End: 2017-11-18

## 2017-11-17 RX ADMIN — DOCUSATE SODIUM 100 MG: 100 CAPSULE, LIQUID FILLED ORAL at 08:01

## 2017-11-17 RX ADMIN — GUAIFENESIN 600 MG: 600 TABLET, EXTENDED RELEASE ORAL at 11:21

## 2017-11-17 RX ADMIN — METOPROLOL TARTRATE 100 MG: 50 TABLET ORAL at 08:00

## 2017-11-17 RX ADMIN — OXYCODONE HYDROCHLORIDE AND ACETAMINOPHEN 2 TABLET: 5; 325 TABLET ORAL at 21:36

## 2017-11-17 RX ADMIN — IPRATROPIUM BROMIDE AND ALBUTEROL SULFATE 1 AMPULE: .5; 3 SOLUTION RESPIRATORY (INHALATION) at 22:18

## 2017-11-17 RX ADMIN — PREDNISONE 20 MG: 20 TABLET ORAL at 08:00

## 2017-11-17 RX ADMIN — CLONIDINE HYDROCHLORIDE 0.1 MG: 0.1 TABLET ORAL at 02:06

## 2017-11-17 RX ADMIN — CARVEDILOL 6.25 MG: 6.25 TABLET, FILM COATED ORAL at 17:34

## 2017-11-17 RX ADMIN — IPRATROPIUM BROMIDE AND ALBUTEROL SULFATE 1 AMPULE: .5; 3 SOLUTION RESPIRATORY (INHALATION) at 18:41

## 2017-11-17 RX ADMIN — FUROSEMIDE 40 MG: 10 INJECTION, SOLUTION INTRAMUSCULAR; INTRAVENOUS at 08:00

## 2017-11-17 RX ADMIN — METOROPROLOL TARTRATE 5 MG: 5 INJECTION, SOLUTION INTRAVENOUS at 08:59

## 2017-11-17 RX ADMIN — FUROSEMIDE 40 MG: 10 INJECTION, SOLUTION INTRAMUSCULAR; INTRAVENOUS at 17:34

## 2017-11-17 RX ADMIN — PREDNISONE 20 MG: 20 TABLET ORAL at 11:21

## 2017-11-17 RX ADMIN — NIFEDIPINE 60 MG: 60 TABLET, FILM COATED, EXTENDED RELEASE ORAL at 08:00

## 2017-11-17 RX ADMIN — FLUOXETINE 40 MG: 20 CAPSULE ORAL at 08:00

## 2017-11-17 RX ADMIN — OXYCODONE HYDROCHLORIDE AND ACETAMINOPHEN 2 TABLET: 5; 325 TABLET ORAL at 13:24

## 2017-11-17 RX ADMIN — OXYCODONE HYDROCHLORIDE AND ACETAMINOPHEN 2 TABLET: 5; 325 TABLET ORAL at 02:06

## 2017-11-17 RX ADMIN — FERROUS SULFATE TAB 325 MG (65 MG ELEMENTAL FE) 325 MG: 325 (65 FE) TAB at 08:01

## 2017-11-17 RX ADMIN — GUAIFENESIN 600 MG: 600 TABLET, EXTENDED RELEASE ORAL at 17:34

## 2017-11-17 RX ADMIN — IPRATROPIUM BROMIDE AND ALBUTEROL SULFATE 1 AMPULE: .5; 3 SOLUTION RESPIRATORY (INHALATION) at 14:43

## 2017-11-17 RX ADMIN — IPRATROPIUM BROMIDE AND ALBUTEROL SULFATE 1 AMPULE: .5; 3 SOLUTION RESPIRATORY (INHALATION) at 10:33

## 2017-11-17 RX ADMIN — GUAIFENESIN 600 MG: 600 TABLET, EXTENDED RELEASE ORAL at 06:06

## 2017-11-17 RX ADMIN — IPRATROPIUM BROMIDE AND ALBUTEROL SULFATE 1 AMPULE: .5; 3 SOLUTION RESPIRATORY (INHALATION) at 06:33

## 2017-11-17 RX ADMIN — CLONIDINE HYDROCHLORIDE 0.1 MG: 0.1 TABLET ORAL at 06:06

## 2017-11-17 RX ADMIN — BUDESONIDE 500 MCG: 0.5 INHALANT RESPIRATORY (INHALATION) at 18:41

## 2017-11-17 RX ADMIN — IPRATROPIUM BROMIDE AND ALBUTEROL SULFATE 1 AMPULE: .5; 3 SOLUTION RESPIRATORY (INHALATION) at 01:46

## 2017-11-17 RX ADMIN — FERROUS SULFATE TAB 325 MG (65 MG ELEMENTAL FE) 325 MG: 325 (65 FE) TAB at 17:34

## 2017-11-17 RX ADMIN — PREDNISONE 20 MG: 20 TABLET ORAL at 17:34

## 2017-11-17 RX ADMIN — DOCUSATE SODIUM 100 MG: 100 CAPSULE, LIQUID FILLED ORAL at 19:53

## 2017-11-17 RX ADMIN — LORAZEPAM 0.5 MG: 0.5 TABLET ORAL at 00:01

## 2017-11-17 RX ADMIN — NIFEDIPINE 60 MG: 60 TABLET, FILM COATED, EXTENDED RELEASE ORAL at 19:54

## 2017-11-17 RX ADMIN — LORAZEPAM 0.5 MG: 0.5 TABLET ORAL at 21:36

## 2017-11-17 ASSESSMENT — PAIN SCALES - GENERAL
PAINLEVEL_OUTOF10: 0
PAINLEVEL_OUTOF10: 8
PAINLEVEL_OUTOF10: 1
PAINLEVEL_OUTOF10: 0
PAINLEVEL_OUTOF10: 7
PAINLEVEL_OUTOF10: 4
PAINLEVEL_OUTOF10: 0
PAINLEVEL_OUTOF10: 7

## 2017-11-17 NOTE — PROGRESS NOTES
02/02/2017 (Approximate)   SpO2 96%   Breastfeeding? Unknown   BMI 44.06 kg/m²   24HR INTAKE/OUTPUT:      Intake/Output Summary (Last 24 hours) at 11/17/17 1019  Last data filed at 11/17/17 0800   Gross per 24 hour   Intake              700 ml   Output             2925 ml   Net            -2225 ml     Net I/O since admission (-13.9 L)    General appearance: alert and cooperative with exam, MUCH more relaxed, comfortable and calm, baby cradled in R arm  HEENT: atraumatic, eyes with clear conjunctiva and normal lids, pupils and irises normal, external ears and nose are normal, lips normal. Neck without masses, lympadenopathy, bruit, thyroid normal  Lungs: no increased work of breathing, clear to auscultation bilaterally without rales, rhonchi or wheezes  Heart: regular rate and rhythm, S1, S2 normal, no murmur, click, rub or gallop  Abdomen: soft, non-tender; bowel sounds normal; no masses,  no organomegaly  Extremities: extremities normal, atraumatic, no cyanosis or edema  Neurologic: No focal neurologic deficits, normal sensation, alert and oriented, affect and mood appropriate.   Skin: no rashes, nodules    Medications:      oxytocin 166 edgard-units/min (11/10/17 2051)    oxytocin      oxytocin Stopped (11/11/17 0655)      metoprolol tartrate  100 mg Oral BID    predniSONE  20 mg Oral TID WC    NIFEdipine  60 mg Oral BID    furosemide  40 mg Intravenous BID    guaiFENesin  600 mg Oral Q6H    ipratropium-albuterol  1 ampule Inhalation Q4H    sodium chloride  250 mL Intravenous Once    sodium chloride  250 mL Intravenous Once    FLUoxetine  40 mg Oral Daily    ferrous sulfate  325 mg Oral BID WC    docusate sodium  100 mg Oral BID     Tetanus-Diphth-Acell Pertussis  0.5 mL Intramuscular Prior to discharge     metoprolol, LORazepam, promethazine, cloNIDine, albuterol, diphenhydrAMINE, ibuprofen, oxyCODONE-acetaminophen, acetaminophen, ondansetron, lidocaine PF, butorphanol, bisacodyl, oxytocin, acetaminophen, lanolin, oxyCODONE-acetaminophen  DIET GENERAL;     Lab and other Data:     Recent Labs      11/15/17   0708  11/16/17   0357  11/17/17   0108   WBC  14.1*  9.5  14.5*   HGB  9.3*  8.8*  10.3*   PLT  304  297  352     Recent Labs      11/15/17   1100  11/16/17   0357  11/17/17   0108   NA  141  143  141   K  3.9  3.7  3.5   CL  100  101  98   CO2  29  31*  32*   BUN  21*  18  21*   CREATININE  0.8  0.7  0.7   GLUCOSE  120*  112*  107     ABGs:   Lab Results   Component Value Date    PHART 7.420 11/13/2017    PO2ART 79.0 11/13/2017    VCH2DUH 38.0 11/13/2017       CXR 11/17/2017  Impression:  Decreased bilateral opacities. This may reflect improved pulmonary edema. Underlying infection is not excluded. Signed by Dr Falguni Fry on 11/17/2017 7:21 AM    Echo: 11/13/2017  Mild concentric left ventricular hypertrophy.   Left ventricular size is normal .   Borderline ejection fraction with an ejection fraction of 45-50%    Venous US 11/14/17  Impression    Normal venous duplex study of the bilateral lower extremity(ies). There is no evidence of deep or superficial venous thrombosis.     Electronically signed by Irwin Kim MD    Micro: Urine culture no growth    Assessment/Plan   Principle Problem:    Acute Respiratory failure - volume overload much improved, c/w ARDS. HCAP unlikely, responding to diuresis  O2. Active problems:    Asthma exacerbation - Duonebs, mucinex, prednisone PO, much improved    Preeclampsia, third trimester - metoprolol, nifedipine; BP improved but fluctuating after delivery, not breast feeding    Leukocytosis - fluctuating likely 2' steroids, PNA considered less likely    Acute blood loss anemia - monitor; stable after 1 unit PRBCs     MDM: She is improved with diuresis, steroids, nebulizers, clinically compatible with ARDS; contributors may include pre-eclampsia, blood transfusion, other.   She is much improved, adjusted BP meds, monitoring renal function, Hg, stable  \ May be able to return to [facility] 1-2 days, not off BIPAP and on 2 L NC with - sat 100% during my visit with her this am.    Carlos Luna MD

## 2017-11-17 NOTE — PROGRESS NOTES
[facility]  Daily Progress Note      Chief Complaint: f/u HTN, HF    Interval Hx: Breathing better, on 1LNC, diuresed by 15 L , cxr still hazy, BP still running high despite addition hydralazine and higher dose of lopressor      ROS:  The rest of the systems are negative except Interval Hx    Current Inpatient Medications:   budesonide  0.5 mg Nebulization BID    carvedilol  6.25 mg Oral BID WC    predniSONE  20 mg Oral TID WC    NIFEdipine  60 mg Oral BID    furosemide  40 mg Intravenous BID    guaiFENesin  600 mg Oral Q6H    ipratropium-albuterol  1 ampule Inhalation Q4H    sodium chloride  250 mL Intravenous Once    sodium chloride  250 mL Intravenous Once    FLUoxetine  40 mg Oral Daily    ferrous sulfate  325 mg Oral BID WC    docusate sodium  100 mg Oral BID     Tetanus-Diphth-Acell Pertussis  0.5 mL Intramuscular Prior to discharge       IV Infusions (if any):   oxytocin 166 edgard-units/min (11/10/17 2051)    oxytocin      oxytocin Stopped (11/11/17 6633)       Physical Exam:   /60   Pulse 82   Temp 96.1 °F (35.6 °C) (Temporal)   Resp 22   Ht 5' 2\" (1.575 m)   Wt 240 lb 14.4 oz (109.3 kg)   LMP 02/02/2017 (Approximate)   SpO2 94%   Breastfeeding?  Unknown   BMI 44.06 kg/m²       Intake/Output Summary (Last 24 hours) at 11/17/17 1631  Last data filed at 11/17/17 1400   Gross per 24 hour   Intake              870 ml   Output             2925 ml   Net            -2055 ml       Gen - NAD  Neck - Supple  ENMT - MMM, OP Clear  Cardio - No JVD     Clear s1 s2, no gallop, rub, murmur                No edema, 2+ radials  Resp - Normal effort, bilat crackles  GI - soft, non-tender, no HSM  Psych - A+O x 3, normal affect     Diagnostics:      Recent Labs      11/15/17   0708  11/16/17   0357  11/17/17   0108   WBC  14.1*  9.5  14.5*   HGB  9.3*  8.8*  10.3*   PLT  304  297  352      Recent Labs      11/15/17   1100  11/16/17   0357  11/17/17   0108   NA  141  143  141   K 3.9  3.7  3.5   CL  100  101  98   CO2  29  31*  32*   BUN  21*  18  21*   CREATININE  0.8  0.7  0.7   LABGLOM  >60  >60  >60   CALCIUM  8.9  8.6  9.1      Recent Labs      11/15/17   1007   PROBNP  726*          Assessment:     30 y.o. female with acute respiratory failure, HTN urgency, ?  Post-partum preclapsia    Plan:     - I am going to order a random urine/protein  - Will change lopressor to Coreg 6.25 BID  - continue IV diuresis    Komal Trujillo MD  [facility]    11/17/2017 4:31 PM

## 2017-11-17 NOTE — CARE COORDINATION
Spoke with pt to f/u after transferring from maternity unit. Pt states infant dc'd home with paternal grandmother [facility]) and paternal aunt with [de-identified] assisting with infant and pt's care as needed. Pt reports has [facility] appointment scheduled for next week and hopes to be dc'd at that point. Pt confirms if unable to attend [facility] appointment then the FoB and PGM can attend and explain the situation to establish services. Pt confirms infant has all basic needs and is doing well. Infant completed his 2-day weight check today at [address] and then was brought to visit with the pt briefly. Pt denies any further incidents with her mother while admitted. Pt reports her mother is Murlene Velasquez causing a lot of drama\" and accusing the pt \"of having the emergency  and the new problems (that caused her transfer to the ICU) as ways (for the pt) to get attention and keep her (pt's mother) from the baby. \" SW explained the pt only need be concerned about addressing her health issues in order to dc home to her child and let her mother continue to think whatever she wants. Pt agrees at this point, it is not healthy for pt to be stressing about what her mother is doing and as long as the baby is being taken care of she should try to focus on herself. SW stated available for any further assistance necessary for pt and for infant needs until pt dc's. SW will continue to follow and assist with dc needs as necessary.

## 2017-11-17 NOTE — PROGRESS NOTES
[facility]  Daily Progress Note      Chief Complaint: f/u HTN, HF    Interval Hx: Breathing better, on 2LNC, diuresed by 12 L , cxr still hazy, BP still running high despite addition hydralazine       ROS:  The rest of the systems are negative except Interval Hx    Current Inpatient Medications:   metoprolol tartrate  100 mg Oral BID    predniSONE  20 mg Oral TID WC    NIFEdipine  60 mg Oral BID    furosemide  40 mg Intravenous BID    guaiFENesin  600 mg Oral Q6H    ipratropium-albuterol  1 ampule Inhalation Q4H    sodium chloride  250 mL Intravenous Once    sodium chloride  250 mL Intravenous Once    FLUoxetine  40 mg Oral Daily    ferrous sulfate  325 mg Oral BID WC    docusate sodium  100 mg Oral BID     Tetanus-Diphth-Acell Pertussis  0.5 mL Intramuscular Prior to discharge       IV Infusions (if any):   oxytocin 166 edgard-units/min (11/10/17 2051)    oxytocin      oxytocin Stopped (11/11/17 0655)       Physical Exam:   BP (!) 163/90   Pulse 74   Temp 97.3 °F (36.3 °C)   Resp 26   Ht 5' 2\" (1.575 m)   Wt 240 lb 14.4 oz (109.3 kg)   LMP 02/02/2017 (Approximate)   SpO2 96%   Breastfeeding?  Unknown   BMI 44.06 kg/m²       Intake/Output Summary (Last 24 hours) at 11/17/17 0834  Last data filed at 11/17/17 0800   Gross per 24 hour   Intake              750 ml   Output             4200 ml   Net            -3450 ml       Gen - NAD  Neck - Supple  ENMT - MMM, OP Clear  Cardio - No JVD     Clear s1 s2, no gallop, rub, murmur                No edema, 2+ radials  Resp - Normal effort, bilat crackles  GI - soft, non-tender, no HSM  Psych - A+O x 3, normal affect     Diagnostics:      Recent Labs      11/15/17   0708  11/16/17   0357  11/17/17   0108   WBC  14.1*  9.5  14.5*   HGB  9.3*  8.8*  10.3*   PLT  304  297  352      Recent Labs      11/15/17   1100  11/16/17   0357  11/17/17   0108   NA  141  143  141   K  3.9  3.7  3.5   CL  100  101  98   CO2  29  31*  32*   BUN  21*  18 21*   CREATININE  0.8  0.7  0.7   LABGLOM  >60  >60  >60   CALCIUM  8.9  8.6  9.1      Recent Labs      11/14/17   1541  11/15/17   1007   PROBNP  571*  726*          Assessment:     30 y.o. female with acute respiratory failure, HTN urgency    Plan:     - BP still elevate, but not 180s  - agree with beta-blocker and hydralazine  - continue diureses  -  Normal LV systolic function      Dayna Longoria MD  [facility]    11/17/2017 8:34 AM

## 2017-11-17 NOTE — PROGRESS NOTES
Postpartum Day 7:  Delivery    Patient is a  that delivered on 11/10/17. The patient is in ICU for respiratory difficulties and continued elevated BP's. She states she feels better today. Being co-managed with cardiology, pulmonology and hospitalist.  The patient denies emotional concerns other than ready to go home. Pain is well controlled with current medications. The baby is in the room this morning with family. Urinary output is adequate via betancourt. The patient is ambulating well. The patient is tolerating a regular diet. Flatus has been passed. Objective:      Patient Vitals for the past 8 hrs:  Vitals:    17 0800   BP:    Pulse: 74   Resp: 26   Temp: 97.3 °F (36.3 °C)   SpO2:                            General:    alert, appears stated age and cooperative   Bowel Sounds:  active   Lochia:  appropriate   Uterine Fundus:   firm   Incision:  healing well, no significant drainage, no dehiscence, no significant erythema   DVT Evaluation:  No evidence of DVT seen on physical exam.                     LUNGS: wheezes noted to right lung fields, left clear, diminished bilateral  Lab Results   Component Value Date    WBC 14.5 (H) 2017    HGB 10.3 (L) 2017    HCT 32.5 (L) 2017    MCV 88.3 2017     2017     Assessment:     Status post  section. Complicated by respiratory distress, pulmonary edema and hypertension. Plan:     Discussed with Dr Diaz Quinn pt's status. BP's still elevated. On O2 @ 3L per NC. Ace remains intact to breasts.

## 2017-11-17 NOTE — PLAN OF CARE
Problem: Anxiety:  Goal: Level of anxiety will decrease  Level of anxiety will decrease   Outcome: Met This Shift      Problem: Venous Thromboembolism - Risk of:  Goal: Will show no signs or symptoms of venous thromboembolism  Will show no signs or symptoms of venous thromboembolism   Outcome: Met This Shift      Problem: VAGINAL DELIVERY - RECOVERY AND POST PARTUM  Goal: Vital signs are medically acceptable  Outcome: Met This Shift    Goal: Patient will remain free of falls  Outcome: Met This Shift    Goal: Moderate rubra without clots, no purulent discharge, no foul smelling lochia  Outcome: Met This Shift    Goal: Empties bladder  Outcome: Met This Shift      Problem: Falls - Risk of  Goal: Absence of falls  Outcome: Met This Shift      Problem: Fluid Volume:  Goal: Hemodynamic stability will improve  Hemodynamic stability will improve   Outcome: Met This Shift    Goal: Ability to maintain a balanced intake and output will improve  Ability to maintain a balanced intake and output will improve   Outcome: Met This Shift      Problem: Respiratory:  Goal: Respiratory status will improve  Respiratory status will improve   Outcome: Met This Shift      Problem: Pain:  Goal: Pain level will decrease  Pain level will decrease   Outcome: Met This Shift    Goal: Control of acute pain  Control of acute pain   Outcome: Met This Shift    Goal: Control of chronic pain  Control of chronic pain   Outcome: Met This Shift

## 2017-11-17 NOTE — PROGRESS NOTES
Pulmonary and Critical Care Progress Note [facility]    Patient: Yennifer Glass  1980   MR# [id number]   Acct# [de-identified]  11/17/17   7:26 AM  Referring Provider: Benjamin Kelly MD  Problem list:   Patient Active Problem List   Diagnosis    Preeclampsia, third trimester    Severe persistent asthma with exacerbation    Acute systolic congestive heart failure ([facility] [address]    Leukocytosis    Acute blood loss anemia    Essential hypertension     Chief Complaint: Dyspnea     Interval history: Patient remains in ICU, on 3 liters O2 (off BiPAP at present) with continued elevated blood pressure. No overnight issues reported. metoprolol tartrate 100 mg Oral BID   predniSONE 20 mg Oral TID WC   NIFEdipine 60 mg Oral BID   furosemide 40 mg Intravenous BID   guaiFENesin 600 mg Oral Q6H   ipratropium-albuterol 1 ampule Inhalation Q4H   sodium chloride 250 mL Intravenous Once   sodium chloride 250 mL Intravenous Once   FLUoxetine 40 mg Oral Daily   ferrous sulfate 325 mg Oral BID WC   docusate sodium 100 mg Oral BID   Tetanus-Diphth-Acell Pertussis 0.5 mL Intramuscular Prior to discharge      oxytocin 166 edgard-units/min (11/10/17 2051)    oxytocin      oxytocin Stopped (11/11/17 0655)     Review of Systems:   ROS   Constitutional: Positive for malaise/fatigue. Negative for chills and fever. HENT: Negative. Eyes: Negative. Respiratory: Positive for cough, shortness of breath and wheezing. Cardiovascular: Positive for leg swelling. Negative for chest pain. Gastrointestinal: Positive for abdominal pain-stable/improving. Genitourinary: Negative. Skin: Negative. Neurological: Positive for weakness-improving. Psychiatric/Behavioral: Negative. Physical Exam:  Blood pressure (!) 163/90, pulse 73, temperature 98.4 °F (36.9 °C), temperature source Temporal, resp.  rate 22, height 5' 2\" (1.575 m), weight 240 lb 14.4 oz (109.3 kg), last menstrual period 02/02/2017, SpO2 96 %, unknown if

## 2017-11-17 NOTE — PLAN OF CARE
Problem: Anxiety:  Goal: Level of anxiety will decrease  Level of anxiety will decrease   Outcome: Met This Shift      Problem: Falls - Risk of  Goal: Absence of falls  Outcome: Ongoing      Problem: Fluid Volume:  Goal: Hemodynamic stability will improve  Hemodynamic stability will improve   Outcome: Ongoing    Goal: Ability to maintain a balanced intake and output will improve  Ability to maintain a balanced intake and output will improve   Outcome: Ongoing      Problem: Respiratory:  Goal: Respiratory status will improve  Respiratory status will improve   Outcome: Ongoing      Problem: Pain:  Goal: Pain level will decrease  Pain level will decrease   Outcome: Ongoing    Goal: Control of acute pain  Control of acute pain   Outcome: Ongoing    Goal: Control of chronic pain  Control of chronic pain   Outcome: Ongoing

## 2017-11-18 ENCOUNTER — APPOINTMENT (OUTPATIENT)
Dept: GENERAL RADIOLOGY | Age: 37
End: 2017-11-18
Attending: OBSTETRICS & GYNECOLOGY
Payer: MEDICAID

## 2017-11-18 ENCOUNTER — APPOINTMENT (OUTPATIENT)
Dept: ULTRASOUND IMAGING | Age: 37
End: 2017-11-18
Attending: OBSTETRICS & GYNECOLOGY
Payer: MEDICAID

## 2017-11-18 LAB
ALBUMIN SERPL-MCNC: 3.1 G/DL (ref 3.5–5.2)
ALP BLD-CCNC: 119 U/L (ref 35–104)
ALT SERPL-CCNC: 26 U/L (ref 5–33)
ANION GAP SERPL CALCULATED.3IONS-SCNC: 12 MMOL/L (ref 7–19)
AST SERPL-CCNC: 21 U/L (ref 5–32)
BASOPHILS ABSOLUTE: 0 K/UL (ref 0–0.2)
BASOPHILS RELATIVE PERCENT: 0.1 % (ref 0–1)
BILIRUB SERPL-MCNC: 0.3 MG/DL (ref 0.2–1.2)
BUN BLDV-MCNC: 20 MG/DL (ref 6–20)
CALCIUM SERPL-MCNC: 9.1 MG/DL (ref 8.6–10)
CHLORIDE BLD-SCNC: 96 MMOL/L (ref 98–111)
CO2: 29 MMOL/L (ref 22–29)
CREAT SERPL-MCNC: 0.6 MG/DL (ref 0.5–0.9)
CREATININE URINE: 243.3 MG/DL (ref 4.2–622)
EOSINOPHILS ABSOLUTE: 0.1 K/UL (ref 0–0.6)
EOSINOPHILS RELATIVE PERCENT: 0.5 % (ref 0–5)
GFR NON-AFRICAN AMERICAN: >60
GLUCOSE BLD-MCNC: 107 MG/DL (ref 74–109)
HCT VFR BLD CALC: 33.9 % (ref 37–47)
HEMOGLOBIN: 10.7 G/DL (ref 12–16)
LYMPHOCYTES ABSOLUTE: 1.7 K/UL (ref 1.1–4.5)
LYMPHOCYTES RELATIVE PERCENT: 11.5 % (ref 20–40)
MCH RBC QN AUTO: 27.6 PG (ref 27–31)
MCHC RBC AUTO-ENTMCNC: 31.6 G/DL (ref 33–37)
MCV RBC AUTO: 87.6 FL (ref 81–99)
MONOCYTES ABSOLUTE: 0.6 K/UL (ref 0–0.9)
MONOCYTES RELATIVE PERCENT: 3.9 % (ref 0–10)
NEUTROPHILS ABSOLUTE: 12.3 K/UL (ref 1.5–7.5)
NEUTROPHILS RELATIVE PERCENT: 82.3 % (ref 50–65)
PDW BLD-RTO: 15.2 % (ref 11.5–14.5)
PLATELET # BLD: 409 K/UL (ref 130–400)
PMV BLD AUTO: 9.6 FL (ref 9.4–12.3)
POTASSIUM SERPL-SCNC: 3.9 MMOL/L (ref 3.5–5)
PROTEIN PROTEIN: 66 MG/DL (ref 15–45)
RBC # BLD: 3.87 M/UL (ref 4.2–5.4)
SODIUM BLD-SCNC: 137 MMOL/L (ref 136–145)
TOTAL PROTEIN: 6.3 G/DL (ref 6.6–8.7)
WBC # BLD: 15 K/UL (ref 4.8–10.8)

## 2017-11-18 PROCEDURE — 99231 SBSQ HOSP IP/OBS SF/LOW 25: CPT | Performed by: INTERNAL MEDICINE

## 2017-11-18 PROCEDURE — 6360000002 HC RX W HCPCS: Performed by: NURSE PRACTITIONER

## 2017-11-18 PROCEDURE — 82570 ASSAY OF URINE CREATININE: CPT

## 2017-11-18 PROCEDURE — 6370000000 HC RX 637 (ALT 250 FOR IP)

## 2017-11-18 PROCEDURE — 2700000000 HC OXYGEN THERAPY PER DAY

## 2017-11-18 PROCEDURE — 36415 COLL VENOUS BLD VENIPUNCTURE: CPT

## 2017-11-18 PROCEDURE — 84156 ASSAY OF PROTEIN URINE: CPT

## 2017-11-18 PROCEDURE — 99233 SBSQ HOSP IP/OBS HIGH 50: CPT | Performed by: HOSPITALIST

## 2017-11-18 PROCEDURE — 6370000000 HC RX 637 (ALT 250 FOR IP): Performed by: NURSE PRACTITIONER

## 2017-11-18 PROCEDURE — 71010 XR CHEST PORTABLE: CPT

## 2017-11-18 PROCEDURE — 80053 COMPREHEN METABOLIC PANEL: CPT

## 2017-11-18 PROCEDURE — 6370000000 HC RX 637 (ALT 250 FOR IP): Performed by: OBSTETRICS & GYNECOLOGY

## 2017-11-18 PROCEDURE — 94640 AIRWAY INHALATION TREATMENT: CPT

## 2017-11-18 PROCEDURE — 6370000000 HC RX 637 (ALT 250 FOR IP): Performed by: HOSPITALIST

## 2017-11-18 PROCEDURE — 1210000000 HC MED SURG R&B

## 2017-11-18 PROCEDURE — 6370000000 HC RX 637 (ALT 250 FOR IP): Performed by: INTERNAL MEDICINE

## 2017-11-18 PROCEDURE — 6360000002 HC RX W HCPCS: Performed by: INTERNAL MEDICINE

## 2017-11-18 PROCEDURE — 93975 VASCULAR STUDY: CPT

## 2017-11-18 PROCEDURE — 85025 COMPLETE CBC W/AUTO DIFF WBC: CPT

## 2017-11-18 RX ORDER — CARVEDILOL 12.5 MG/1
12.5 TABLET ORAL 2 TIMES DAILY WITH MEALS
Status: DISCONTINUED | OUTPATIENT
Start: 2017-11-18 | End: 2017-11-19

## 2017-11-18 RX ORDER — FUROSEMIDE 40 MG/1
40 TABLET ORAL 2 TIMES DAILY
Status: DISCONTINUED | OUTPATIENT
Start: 2017-11-18 | End: 2017-11-20

## 2017-11-18 RX ADMIN — IBUPROFEN 800 MG: 400 TABLET, FILM COATED ORAL at 08:43

## 2017-11-18 RX ADMIN — GUAIFENESIN 600 MG: 600 TABLET, EXTENDED RELEASE ORAL at 22:54

## 2017-11-18 RX ADMIN — CARVEDILOL 12.5 MG: 12.5 TABLET, FILM COATED ORAL at 17:20

## 2017-11-18 RX ADMIN — OXYCODONE HYDROCHLORIDE AND ACETAMINOPHEN 2 TABLET: 5; 325 TABLET ORAL at 19:21

## 2017-11-18 RX ADMIN — BUDESONIDE 500 MCG: 0.5 INHALANT RESPIRATORY (INHALATION) at 06:20

## 2017-11-18 RX ADMIN — CLONIDINE HYDROCHLORIDE 0.1 MG: 0.1 TABLET ORAL at 06:39

## 2017-11-18 RX ADMIN — PREDNISONE 20 MG: 20 TABLET ORAL at 08:43

## 2017-11-18 RX ADMIN — CLONIDINE HYDROCHLORIDE 0.1 MG: 0.1 TABLET ORAL at 11:28

## 2017-11-18 RX ADMIN — IPRATROPIUM BROMIDE AND ALBUTEROL SULFATE 1 AMPULE: .5; 3 SOLUTION RESPIRATORY (INHALATION) at 10:14

## 2017-11-18 RX ADMIN — DOCUSATE SODIUM 100 MG: 100 CAPSULE, LIQUID FILLED ORAL at 20:38

## 2017-11-18 RX ADMIN — FLUOXETINE 40 MG: 20 CAPSULE ORAL at 08:43

## 2017-11-18 RX ADMIN — OXYCODONE HYDROCHLORIDE AND ACETAMINOPHEN 1 TABLET: 5; 325 TABLET ORAL at 23:21

## 2017-11-18 RX ADMIN — OXYCODONE HYDROCHLORIDE AND ACETAMINOPHEN 2 TABLET: 5; 325 TABLET ORAL at 14:11

## 2017-11-18 RX ADMIN — GUAIFENESIN 600 MG: 600 TABLET, EXTENDED RELEASE ORAL at 05:25

## 2017-11-18 RX ADMIN — FUROSEMIDE 40 MG: 40 TABLET ORAL at 17:20

## 2017-11-18 RX ADMIN — FUROSEMIDE 40 MG: 10 INJECTION, SOLUTION INTRAMUSCULAR; INTRAVENOUS at 08:42

## 2017-11-18 RX ADMIN — IPRATROPIUM BROMIDE AND ALBUTEROL SULFATE 1 AMPULE: .5; 3 SOLUTION RESPIRATORY (INHALATION) at 15:08

## 2017-11-18 RX ADMIN — CARVEDILOL 12.5 MG: 12.5 TABLET, FILM COATED ORAL at 08:43

## 2017-11-18 RX ADMIN — GUAIFENESIN 600 MG: 600 TABLET, EXTENDED RELEASE ORAL at 17:20

## 2017-11-18 RX ADMIN — IBUPROFEN 800 MG: 400 TABLET, FILM COATED ORAL at 00:48

## 2017-11-18 RX ADMIN — FERROUS SULFATE TAB 325 MG (65 MG ELEMENTAL FE) 325 MG: 325 (65 FE) TAB at 08:43

## 2017-11-18 RX ADMIN — DOCUSATE SODIUM 100 MG: 100 CAPSULE, LIQUID FILLED ORAL at 08:43

## 2017-11-18 RX ADMIN — PREDNISONE 20 MG: 20 TABLET ORAL at 11:27

## 2017-11-18 RX ADMIN — FERROUS SULFATE TAB 325 MG (65 MG ELEMENTAL FE) 325 MG: 325 (65 FE) TAB at 17:20

## 2017-11-18 RX ADMIN — PREDNISONE 20 MG: 20 TABLET ORAL at 17:20

## 2017-11-18 RX ADMIN — GUAIFENESIN 600 MG: 600 TABLET, EXTENDED RELEASE ORAL at 11:27

## 2017-11-18 RX ADMIN — OXYCODONE HYDROCHLORIDE AND ACETAMINOPHEN 2 TABLET: 5; 325 TABLET ORAL at 06:39

## 2017-11-18 RX ADMIN — IPRATROPIUM BROMIDE AND ALBUTEROL SULFATE 1 AMPULE: .5; 3 SOLUTION RESPIRATORY (INHALATION) at 06:20

## 2017-11-18 RX ADMIN — IPRATROPIUM BROMIDE AND ALBUTEROL SULFATE 1 AMPULE: .5; 3 SOLUTION RESPIRATORY (INHALATION) at 18:20

## 2017-11-18 RX ADMIN — NIFEDIPINE 60 MG: 60 TABLET, FILM COATED, EXTENDED RELEASE ORAL at 20:38

## 2017-11-18 RX ADMIN — IPRATROPIUM BROMIDE AND ALBUTEROL SULFATE 1 AMPULE: .5; 3 SOLUTION RESPIRATORY (INHALATION) at 22:55

## 2017-11-18 RX ADMIN — NIFEDIPINE 60 MG: 60 TABLET, FILM COATED, EXTENDED RELEASE ORAL at 08:43

## 2017-11-18 RX ADMIN — BUDESONIDE 500 MCG: 0.5 INHALANT RESPIRATORY (INHALATION) at 18:19

## 2017-11-18 RX ADMIN — IPRATROPIUM BROMIDE AND ALBUTEROL SULFATE 1 AMPULE: .5; 3 SOLUTION RESPIRATORY (INHALATION) at 02:30

## 2017-11-18 ASSESSMENT — PAIN SCALES - GENERAL
PAINLEVEL_OUTOF10: 5
PAINLEVEL_OUTOF10: 4
PAINLEVEL_OUTOF10: 6
PAINLEVEL_OUTOF10: 6
PAINLEVEL_OUTOF10: 5
PAINLEVEL_OUTOF10: 4
PAINLEVEL_OUTOF10: 6
PAINLEVEL_OUTOF10: 5
PAINLEVEL_OUTOF10: 2
PAINLEVEL_OUTOF10: 8

## 2017-11-18 ASSESSMENT — PAIN DESCRIPTION - PAIN TYPE: TYPE: SURGICAL PAIN

## 2017-11-18 NOTE — PROGRESS NOTES
data filed at 11/18/17 8775   Gross per 24 hour   Intake             1200 ml   Output             2975 ml   Net            -1775 ml     Net I/O since admission (-15.6 L)    General appearance: alert and cooperative with exam, MUCH more relaxed, comfortable and calm, SO at bedside  HEENT: atraumatic, eyes with clear conjunctiva and normal lids, pupils and irises normal, external ears and nose are normal, lips normal. Neck without masses, lympadenopathy, bruit, thyroid normal  Lungs: no increased work of breathing, clear to auscultation bilaterally without rales, rhonchi or wheezes  Heart: regular rate and rhythm, S1, S2 normal, no murmur, click, rub or gallop  Abdomen: soft, non-tender; bowel sounds normal; no masses,  no organomegaly  Extremities: extremities normal, atraumatic, no cyanosis or edema  Neurologic: No focal neurologic deficits, normal sensation, alert and oriented, affect and mood appropriate.   Skin: no rashes, nodules    Medications:      oxytocin 166 edgard-units/min (11/10/17 2051)    oxytocin      oxytocin Stopped (11/11/17 0655)      carvedilol  12.5 mg Oral BID     budesonide  0.5 mg Nebulization BID    predniSONE  20 mg Oral TID WC    NIFEdipine  60 mg Oral BID    furosemide  40 mg Intravenous BID    guaiFENesin  600 mg Oral Q6H    ipratropium-albuterol  1 ampule Inhalation Q4H    sodium chloride  250 mL Intravenous Once    sodium chloride  250 mL Intravenous Once    FLUoxetine  40 mg Oral Daily    ferrous sulfate  325 mg Oral BID     docusate sodium  100 mg Oral BID     Tetanus-Diphth-Acell Pertussis  0.5 mL Intramuscular Prior to discharge     metoprolol, LORazepam, promethazine, cloNIDine, albuterol, diphenhydrAMINE, ibuprofen, oxyCODONE-acetaminophen, acetaminophen, ondansetron, lidocaine PF, butorphanol, bisacodyl, oxytocin, acetaminophen, lanolin, oxyCODONE-acetaminophen  DIET GENERAL;     Lab and other Data:     Recent Labs      11/16/17   0357  11/17/17   0108 17   0108   WBC  9.5  14.5*  15.0*   HGB  8.8*  10.3*  10.7*   PLT  297  352  409*     Recent Labs      17   0357  17   0108  17   0108   NA  143  141  137   K  3.7  3.5  3.9   CL  101  98  96*   CO2  31*  32*  29   BUN  18  21*  20   CREATININE  0.7  0.7  0.6   GLUCOSE  112*  107  107     ABGs:   Lab Results   Component Value Date    PHART 7.420 2017    PO2ART 79.0 2017    RTB6MFS 38.0 2017       CXR 2017 (my view:  essentially normal)  Impression  1. Previously identified lung infiltrates are significantly improved with only a       small amount of residual perihilar infiltrate on the right. Signed by Dr Kg Mendoza on 2017 8:35 AM    Echo: 2017  Mild concentric left ventricular hypertrophy.   Left ventricular size is normal .   Borderline ejection fraction with an ejection fraction of 45-50%    Venous US 17  Impression    Normal venous duplex study of the bilateral lower extremity(ies). There is no evidence of deep or superficial venous thrombosis.     Electronically signed by Keyon Wiley MD    Micro: Urine culture still no growth    Assessment/Plan   Principle Problem:    Acute Respiratory failure - volume overload much improved, c/w ARDS, much improved, responding to diuresis, O2. Active problems:    Asthma exacerbation - Duonebs, mucinex, prednisone PO, much improved    Preeclampsia, now S/P  - Coreg, nifedipine; BP improved, not breast feeding    Leukocytosis - fluctuating likely 2' steroids, PNA considered less likely    Acute blood loss anemia - monitor; stable after 1 unit PRBCs     MDM: She is improved with diuresis, steroids, nebulizers, clinically compatible with ARDS; contributors may include pre-eclampsia, blood transfusion, other. Agree with Coreg adjustment, consider 25 BID tomorow, monitoring renal function, Hg, stable, OK to OB today. Discussed with Dr Vianney Barahona.     Salina Cabrera MD

## 2017-11-18 NOTE — PROGRESS NOTES
Pulmonary and Critical Care Progress Note [address] [facility]    Patient: Archana Anthony  1980   MR# [id number]   Acct# [de-identified]  11/18/17   10:30 AM  Referring Provider: Mart Moise MD  Problem list:   Patient Active Problem List   Diagnosis    Preeclampsia, third trimester    Severe persistent asthma with exacerbation    Acute systolic congestive heart failure (HCC)    Leukocytosis    Acute blood loss anemia    Essential hypertension    ARDS (adult respiratory distress syndrome) ([facility] [address]    Acute respiratory failure with hypoxia ([facility] [address]     Chief Complaint: Dyspnea     Interval history: The patient currently has O2 sats in the low 90s on room air even while asleep.    carvedilol 12.5 mg Oral BID WC   furosemide 40 mg Oral BID   budesonide 0.5 mg Nebulization BID   predniSONE 20 mg Oral TID WC   NIFEdipine 60 mg Oral BID   guaiFENesin 600 mg Oral Q6H   ipratropium-albuterol 1 ampule Inhalation Q4H   sodium chloride 250 mL Intravenous Once   sodium chloride 250 mL Intravenous Once   FLUoxetine 40 mg Oral Daily   ferrous sulfate 325 mg Oral BID WC   docusate sodium 100 mg Oral BID   Tetanus-Diphth-Acell Pertussis 0.5 mL Intramuscular Prior to discharge      oxytocin 166 edgard-units/min (11/10/17 2051)    oxytocin      oxytocin Stopped (11/11/17 0655)     Review of Systems:   ROS   Constitutional: Positive for malaise/fatigue. Negative for chills and fever. HENT: Negative. Eyes: Negative. Respiratory: Positive for cough, shortness of breath and wheezing. Cardiovascular: Positive for leg swelling. Negative for chest pain. Gastrointestinal: Positive for abdominal pain-stable/improving. Genitourinary: Negative. Skin: Negative. Neurological: Positive for weakness-improving. Psychiatric/Behavioral: Negative. Physical Exam:  Blood pressure (!) 152/77, pulse 88, temperature 98.3 °F (36.8 °C), temperature source Temporal, resp.  rate 24, height 5' 2\" (1.575 m), weight 230 lb 14.4 oz (104.7 kg), last menstrual period 02/02/2017, SpO2 92 %, unknown if currently breastfeeding. Intake/Output Summary (Last 24 hours) at 11/18/17 1030  Last data filed at 11/18/17 0800   Gross per 24 hour   Intake             1200 ml   Output             1775 ml   Net             -575 ml     Physical Exam   Constitutional: She is oriented to person, place, and time. She appears well-developed and well-nourished. She is on room air and while asleep her O2 sats are still in low 90s. She did arouse easily. Non-toxic appearance. HENT:   Head: Normocephalic and atraumatic. Eyes: Pupils are equal, round, and reactive to light. No scleral icterus. Neck: Normal range of motion. Neck supple. Cardiovascular: Normal rate and regular rhythm. Pulmonary/Chest: Her chest is clear to exam.  Abdominal: Soft. Bowel sounds are normal.   Musculoskeletal: Peripheral edema has improved. Neurological: She is oriented to person, place, and time. She moves all extremities. Skin: Skin is warm and dry. Psychiatric: She has a normal mood and affect. Nursing note and vitals reviewed. Recent Labs      11/16/17   0357  11/17/17   0108  11/18/17   0108   WBC  9.5  14.5*  15.0*   RBC  3.15*  3.68*  3.87*   HGB  8.8*  10.3*  10.7*   HCT  28.2*  32.5*  33.9*   PLT  297  352  409*   MCV  89.5  88.3  87.6   MCH  27.9  28.0  27.6   MCHC  31.2*  31.7*  31.6*   RDW  15.9*  15.2*  15.2*      Recent Labs      11/16/17   0357  11/17/17   0108  11/18/17   0108   NA  143  141  137   K  3.7  3.5  3.9   CL  101  98  96*   CO2  31*  32*  29   BUN  18  21*  20   CREATININE  0.7  0.7  0.6   CALCIUM  8.6  9.1  9.1   GLUCOSE  112*  107  107   AST  21  18  21   ALT  31  29  26   ALKPHOS  114*  134*  119*   BILITOT  0.3  0.3  0.3      Recent Labs      11/15/17   1046   BC  No Growth to date. Any change in status will be called.      Radiograph:   arrative   XR CHEST PORTABLE 11/18/2017 5:00 AM   HISTORY: Pulmonary edema   COMPARISON: Exam dated 11/17/2017. FINDINGS:    Previously identified patchy lung infiltrates appear improved. Upper   limit of normal for heart size. The pulmonary vasculature is   unremarkable. No new focal lung infiltrates. No effusion or   pneumothorax. No acute bony abnormality.       Impression   1. Previously identified lung infiltrates are significantly improved   with only a small amount of residual perihilar infiltrate on the   right. Signed by Dr Nia Roldan on 11/18/2017 8:35 AM             My radiograph interpretation: Marked improvement in the previously noted bilateral infiltrates with some minimal residual present in the right perihilar area. Pulmonary Assessment:    1. Pulmonary edema, possible ARDS, virtually resolved. 2. Acute respiratory distress, also much improved. 3. History of asthma    Recommend:   · At present does not appear she'll need home oxygen therapy. She will be alright for transfer to the floor from a pulmonary standpoint. In terms of her underlying asthma, she apparently had been on an albuterol inhaler for prn use as an outpatient. Sometimes pregnant asthma patients will have improvement in their asthma symptoms and control post delivery. If this is not the case, the recommendation would be maintenance therapy with an inhaled steroid but this can be addressed per her primary care physician. Pulmonary will sign off.     Electronically signed by Shani Gan on 11/18/2017 at 10:30 AM

## 2017-11-18 NOTE — PROGRESS NOTES
Nurse called OB unit about on call physician and possible transfer of patient back to their unit, OB nurse stated the  said she would go back to another floor after ICU as she is out of Postpartum time window. Call placed to [facility] on call physician about transfer of patient.

## 2017-11-18 NOTE — PROGRESS NOTES
21*  20   CREATININE  0.7  0.7  0.6   LABGLOM  >60  >60  >60   CALCIUM  8.6  9.1  9.1      Recent Labs      11/15/17   1007   PROBNP  726*          Assessment:     30 y.o. female with acute respiratory failure, HTN urgency, ?  Post-partum preclapsia    Plan:     -  random urine/protein 0.27 (low limit of pre-eclampsia range)  - Increase Coreg 12.5 BID  - change diuretic to oral  - agree with transfer to post-partum    Diego Keith MD  [facility] [place]    11/18/2017 9:36 AM

## 2017-11-18 NOTE — PROGRESS NOTES
Patient being transferred to: 324 unit via  in a wheelchair  Report called to: Kasia Martínez RN    Patient condition: Stable  Telemetry monitoring in place: Yes  LDA's continued upon transfer: IID Left forearm    Family notified: Yes           (Name: Mr Natalie Mcclendon, Time: 1500)    All personal belongings sent with patient. Halliee at bedside, aware of transfer and room number. Vital signs have remained stable.

## 2017-11-19 ENCOUNTER — APPOINTMENT (OUTPATIENT)
Dept: GENERAL RADIOLOGY | Age: 37
End: 2017-11-19
Attending: OBSTETRICS & GYNECOLOGY
Payer: MEDICAID

## 2017-11-19 LAB
ALBUMIN SERPL-MCNC: 2.9 G/DL (ref 3.5–5.2)
ALP BLD-CCNC: 113 U/L (ref 35–104)
ALT SERPL-CCNC: 25 U/L (ref 5–33)
ANION GAP SERPL CALCULATED.3IONS-SCNC: 12 MMOL/L (ref 7–19)
AST SERPL-CCNC: 18 U/L (ref 5–32)
BASOPHILS ABSOLUTE: 0 K/UL (ref 0–0.2)
BASOPHILS RELATIVE PERCENT: 0.1 % (ref 0–1)
BILIRUB SERPL-MCNC: <0.2 MG/DL (ref 0.2–1.2)
BUN BLDV-MCNC: 23 MG/DL (ref 6–20)
CALCIUM SERPL-MCNC: 9.3 MG/DL (ref 8.6–10)
CHLORIDE BLD-SCNC: 101 MMOL/L (ref 98–111)
CO2: 26 MMOL/L (ref 22–29)
CREAT SERPL-MCNC: 0.6 MG/DL (ref 0.5–0.9)
EOSINOPHILS ABSOLUTE: 0.1 K/UL (ref 0–0.6)
EOSINOPHILS RELATIVE PERCENT: 0.4 % (ref 0–5)
GFR NON-AFRICAN AMERICAN: >60
GLUCOSE BLD-MCNC: 104 MG/DL (ref 74–109)
HCT VFR BLD CALC: 34.7 % (ref 37–47)
HEMOGLOBIN: 11 G/DL (ref 12–16)
LYMPHOCYTES ABSOLUTE: 2.4 K/UL (ref 1.1–4.5)
LYMPHOCYTES RELATIVE PERCENT: 17.4 % (ref 20–40)
MCH RBC QN AUTO: 28 PG (ref 27–31)
MCHC RBC AUTO-ENTMCNC: 31.7 G/DL (ref 33–37)
MCV RBC AUTO: 88.3 FL (ref 81–99)
MONOCYTES ABSOLUTE: 0.6 K/UL (ref 0–0.9)
MONOCYTES RELATIVE PERCENT: 4.2 % (ref 0–10)
NEUTROPHILS ABSOLUTE: 10.6 K/UL (ref 1.5–7.5)
NEUTROPHILS RELATIVE PERCENT: 76.6 % (ref 50–65)
PDW BLD-RTO: 14.6 % (ref 11.5–14.5)
PLATELET # BLD: 357 K/UL (ref 130–400)
PMV BLD AUTO: 9.8 FL (ref 9.4–12.3)
POTASSIUM SERPL-SCNC: 4 MMOL/L (ref 3.5–5)
RBC # BLD: 3.93 M/UL (ref 4.2–5.4)
SODIUM BLD-SCNC: 139 MMOL/L (ref 136–145)
TOTAL PROTEIN: 6.3 G/DL (ref 6.6–8.7)
WBC # BLD: 13.8 K/UL (ref 4.8–10.8)

## 2017-11-19 PROCEDURE — 80053 COMPREHEN METABOLIC PANEL: CPT

## 2017-11-19 PROCEDURE — 6370000000 HC RX 637 (ALT 250 FOR IP)

## 2017-11-19 PROCEDURE — 71010 XR CHEST PORTABLE: CPT

## 2017-11-19 PROCEDURE — 6370000000 HC RX 637 (ALT 250 FOR IP): Performed by: INTERNAL MEDICINE

## 2017-11-19 PROCEDURE — 6370000000 HC RX 637 (ALT 250 FOR IP): Performed by: NURSE PRACTITIONER

## 2017-11-19 PROCEDURE — 99233 SBSQ HOSP IP/OBS HIGH 50: CPT | Performed by: HOSPITALIST

## 2017-11-19 PROCEDURE — 6360000002 HC RX W HCPCS: Performed by: NURSE PRACTITIONER

## 2017-11-19 PROCEDURE — 94640 AIRWAY INHALATION TREATMENT: CPT

## 2017-11-19 PROCEDURE — 1210000000 HC MED SURG R&B

## 2017-11-19 PROCEDURE — 36415 COLL VENOUS BLD VENIPUNCTURE: CPT

## 2017-11-19 PROCEDURE — 85025 COMPLETE CBC W/AUTO DIFF WBC: CPT

## 2017-11-19 PROCEDURE — 6370000000 HC RX 637 (ALT 250 FOR IP): Performed by: OBSTETRICS & GYNECOLOGY

## 2017-11-19 PROCEDURE — 99221 1ST HOSP IP/OBS SF/LOW 40: CPT | Performed by: INTERNAL MEDICINE

## 2017-11-19 PROCEDURE — 6370000000 HC RX 637 (ALT 250 FOR IP): Performed by: HOSPITALIST

## 2017-11-19 PROCEDURE — 6360000002 HC RX W HCPCS: Performed by: HOSPITALIST

## 2017-11-19 RX ORDER — CARVEDILOL 25 MG/1
25 TABLET ORAL 2 TIMES DAILY WITH MEALS
Status: DISCONTINUED | OUTPATIENT
Start: 2017-11-19 | End: 2017-11-20 | Stop reason: HOSPADM

## 2017-11-19 RX ORDER — ALBUTEROL SULFATE 2.5 MG/3ML
2.5 SOLUTION RESPIRATORY (INHALATION) EVERY 4 HOURS PRN
Status: DISCONTINUED | OUTPATIENT
Start: 2017-11-19 | End: 2017-11-20 | Stop reason: HOSPADM

## 2017-11-19 RX ORDER — PREDNISONE 20 MG/1
20 TABLET ORAL 2 TIMES DAILY
Status: DISCONTINUED | OUTPATIENT
Start: 2017-11-19 | End: 2017-11-20 | Stop reason: HOSPADM

## 2017-11-19 RX ADMIN — GUAIFENESIN 600 MG: 600 TABLET, EXTENDED RELEASE ORAL at 05:03

## 2017-11-19 RX ADMIN — FUROSEMIDE 40 MG: 40 TABLET ORAL at 09:20

## 2017-11-19 RX ADMIN — DOCUSATE SODIUM 100 MG: 100 CAPSULE, LIQUID FILLED ORAL at 20:31

## 2017-11-19 RX ADMIN — PREDNISONE 20 MG: 20 TABLET ORAL at 13:02

## 2017-11-19 RX ADMIN — IPRATROPIUM BROMIDE AND ALBUTEROL SULFATE 1 AMPULE: .5; 3 SOLUTION RESPIRATORY (INHALATION) at 11:17

## 2017-11-19 RX ADMIN — MINERAL OIL, PETROLATUM, PHENYLEPHRINE HYDROCHLORIDE: 140; 749; 2.5 OINTMENT RECTAL; TOPICAL at 09:21

## 2017-11-19 RX ADMIN — OXYCODONE HYDROCHLORIDE AND ACETAMINOPHEN 2 TABLET: 5; 325 TABLET ORAL at 05:06

## 2017-11-19 RX ADMIN — IPRATROPIUM BROMIDE AND ALBUTEROL SULFATE 1 AMPULE: .5; 3 SOLUTION RESPIRATORY (INHALATION) at 06:55

## 2017-11-19 RX ADMIN — OXYCODONE HYDROCHLORIDE AND ACETAMINOPHEN 2 TABLET: 5; 325 TABLET ORAL at 13:02

## 2017-11-19 RX ADMIN — FLUOXETINE 40 MG: 20 CAPSULE ORAL at 09:21

## 2017-11-19 RX ADMIN — ALBUTEROL SULFATE 2.5 MG: 2.5 SOLUTION RESPIRATORY (INHALATION) at 18:33

## 2017-11-19 RX ADMIN — GUAIFENESIN 600 MG: 600 TABLET, EXTENDED RELEASE ORAL at 22:10

## 2017-11-19 RX ADMIN — PREDNISONE 20 MG: 20 TABLET ORAL at 09:21

## 2017-11-19 RX ADMIN — FERROUS SULFATE TAB 325 MG (65 MG ELEMENTAL FE) 325 MG: 325 (65 FE) TAB at 09:20

## 2017-11-19 RX ADMIN — NIFEDIPINE 60 MG: 60 TABLET, FILM COATED, EXTENDED RELEASE ORAL at 20:31

## 2017-11-19 RX ADMIN — OXYCODONE HYDROCHLORIDE AND ACETAMINOPHEN 2 TABLET: 5; 325 TABLET ORAL at 17:59

## 2017-11-19 RX ADMIN — IPRATROPIUM BROMIDE AND ALBUTEROL SULFATE 1 AMPULE: .5; 3 SOLUTION RESPIRATORY (INHALATION) at 15:05

## 2017-11-19 RX ADMIN — MINERAL OIL, PETROLATUM, PHENYLEPHRINE HYDROCHLORIDE: 140; 749; 2.5 OINTMENT RECTAL; TOPICAL at 22:12

## 2017-11-19 RX ADMIN — CARVEDILOL 25 MG: 25 TABLET, FILM COATED ORAL at 09:20

## 2017-11-19 RX ADMIN — DOCUSATE SODIUM 100 MG: 100 CAPSULE, LIQUID FILLED ORAL at 09:20

## 2017-11-19 RX ADMIN — GUAIFENESIN 600 MG: 600 TABLET, EXTENDED RELEASE ORAL at 13:02

## 2017-11-19 RX ADMIN — FUROSEMIDE 40 MG: 40 TABLET ORAL at 17:58

## 2017-11-19 RX ADMIN — DIPHENHYDRAMINE HCL 25 MG: 25 TABLET ORAL at 22:10

## 2017-11-19 RX ADMIN — OXYCODONE HYDROCHLORIDE AND ACETAMINOPHEN 2 TABLET: 5; 325 TABLET ORAL at 23:53

## 2017-11-19 RX ADMIN — CARVEDILOL 25 MG: 25 TABLET, FILM COATED ORAL at 17:59

## 2017-11-19 RX ADMIN — GUAIFENESIN 600 MG: 600 TABLET, EXTENDED RELEASE ORAL at 17:59

## 2017-11-19 RX ADMIN — FERROUS SULFATE TAB 325 MG (65 MG ELEMENTAL FE) 325 MG: 325 (65 FE) TAB at 17:59

## 2017-11-19 RX ADMIN — BUDESONIDE 500 MCG: 0.5 INHALANT RESPIRATORY (INHALATION) at 06:55

## 2017-11-19 RX ADMIN — IPRATROPIUM BROMIDE AND ALBUTEROL SULFATE 1 AMPULE: .5; 3 SOLUTION RESPIRATORY (INHALATION) at 02:38

## 2017-11-19 RX ADMIN — OXYCODONE HYDROCHLORIDE AND ACETAMINOPHEN 2 TABLET: 5; 325 TABLET ORAL at 09:20

## 2017-11-19 RX ADMIN — PREDNISONE 20 MG: 20 TABLET ORAL at 20:31

## 2017-11-19 RX ADMIN — NIFEDIPINE 60 MG: 60 TABLET, FILM COATED, EXTENDED RELEASE ORAL at 09:20

## 2017-11-19 ASSESSMENT — PAIN SCALES - GENERAL
PAINLEVEL_OUTOF10: 8
PAINLEVEL_OUTOF10: 5
PAINLEVEL_OUTOF10: 4
PAINLEVEL_OUTOF10: 5
PAINLEVEL_OUTOF10: 5
PAINLEVEL_OUTOF10: 6

## 2017-11-19 NOTE — PROGRESS NOTES
[facility]  Daily Progress Note      Chief Complaint: f/u HTN, HF    Interval Hx: No SOA, some productive cough, diuresed by 16.6 L ,  BP with isolated highs    ROS:  The rest of the systems are negative except Interval Hx    Current Inpatient Medications:   carvedilol  25 mg Oral BID WC    furosemide  40 mg Oral BID    budesonide  0.5 mg Nebulization BID    predniSONE  20 mg Oral TID WC    NIFEdipine  60 mg Oral BID    guaiFENesin  600 mg Oral Q6H    ipratropium-albuterol  1 ampule Inhalation Q4H    sodium chloride  250 mL Intravenous Once    sodium chloride  250 mL Intravenous Once    FLUoxetine  40 mg Oral Daily    ferrous sulfate  325 mg Oral BID WC    docusate sodium  100 mg Oral BID     Tetanus-Diphth-Acell Pertussis  0.5 mL Intramuscular Prior to discharge       IV Infusions (if any):   oxytocin 166 edgard-units/min (11/10/17 2051)    oxytocin      oxytocin Stopped (11/11/17 0655)       Physical Exam:   BP (!) 170/98   Pulse 92   Temp 98.6 °F (37 °C)   Resp 16   Ht 5' 2\" (1.575 m)   Wt 230 lb 14.4 oz (104.7 kg)   LMP 02/02/2017 (Approximate)   SpO2 98%   Breastfeeding?  Unknown   BMI 42.23 kg/m²       Intake/Output Summary (Last 24 hours) at 11/19/17 0755  Last data filed at 11/19/17 0508   Gross per 24 hour   Intake              240 ml   Output             1250 ml   Net            -1010 ml       Gen - NAD  Neck - Supple  ENMT - MMM, OP Clear  Cardio - No JVD     Clear s1 s2, no gallop, rub, murmur                No edema, 2+ radials  Resp - Normal effort, cta  GI - soft, non-tender, no HSM  Psych - A+O x 3, normal affect     Diagnostics:      Recent Labs      11/17/17 0108 11/18/17 0108 11/19/17   0351   WBC  14.5*  15.0*  13.8*   HGB  10.3*  10.7*  11.0*   PLT  352  409*  357      Recent Labs      11/17/17 0108 11/18/17 0108 11/19/17   0351   NA  141  137  139   K  3.5  3.9  4.0   CL  98  96*  101   CO2  32*  29  26   BUN  21*  20  23*   CREATININE  0.7 0.6  0.6   LABGLOM  >60  >60  >60   CALCIUM  9.1  9.1  9.3      No results for input(s): TROPONINI, PROBNP in the last 72 hours. Assessment:     30 y.o. female with acute respiratory failure, HTN urgency, ?  Post-partum preclapsia    Plan:     - Renal US with no evidence of MYRNA  - Increase Coreg 25 BID    Vidhya Govea MD  [facility]    11/19/2017 7:55 AM

## 2017-11-19 NOTE — PROGRESS NOTES
BUN  21*  20  23*   CREATININE  0.7  0.6  0.6   GLUCOSE  107  107  104     ABGs:   Lab Results   Component Value Date    PHART 7.420 2017    PO2ART 79.0 2017    UBJ3BKC 38.0 2017       CXR 2017 (my view:  essentially normal)  Impression  1. Previously identified lung infiltrates are significantly improved with only a       small amount of residual perihilar infiltrate on the right. Signed by Dr Zuly Avelar on 2017 8:35 AM    Echo: 2017  Mild concentric left ventricular hypertrophy.   Left ventricular size is normal .   Borderline ejection fraction with an ejection fraction of 45-50%    Venous US 17  Impression    Normal venous duplex study of the bilateral lower extremity(ies). There is no evidence of deep or superficial venous thrombosis.     Electronically signed by Tino Falk MD    Micro: Urine culture still no growth    Assessment/Plan   Principle Problem:    Acute Respiratory failure - volume overload much improved, c/w ARDS, much improved, responding to diuresis, now on RA. Active problems:    Asthma exacerbation - Duonebs, mucinex, prednisone PO, much improved    Preeclampsia, now S/P  - Coreg, nifedipine; BP improved, not breast feeding    Leukocytosis - fluctuating likely 2' steroids, PNA considered less likely    Acute blood loss anemia - monitor; stable after 1 unit PRBCs     MDM: She is improved with diuresis, steroids, nebulizers, clinically compatible with ARDS; contributors may include pre-eclampsia, blood transfusion, other.   Agree with Coreg adjustment, to 25mg PO BID VS, renal funtion fine, could be discharged home anytime on oral regimen, albuterol nebulizer can transition to MDI she already has at home, taper PO steroids as outpatient by 10 mg every 3rd day (20/20, 20/10, 20/0 10/0, then D/C)     Clyde Cook MD

## 2017-11-19 NOTE — PROGRESS NOTES
Pt voided 650 mL bloody urine output, quarter-to-golf ball sized clot noted in toilet. Pt reports this is the only clot she has passed in the last several days. Pt was provided another package of pads and was given another 10 mg of Percocet for incisional pain. When asked about lactation, pt states she has not had any leakage. Pt was notified that she has Benadryl ordered as a PRN medication, but did not request any at this time. Pt denies any further needs at this time.     Juan Lorenzana RN  11/19/2017  13:13

## 2017-11-19 NOTE — PROGRESS NOTES
Patient stated her hemorrhoids were really bothering her.   Messaged hospitalist, ordered preparation H.

## 2017-11-20 VITALS
BODY MASS INDEX: 42.49 KG/M2 | RESPIRATION RATE: 16 BRPM | HEIGHT: 62 IN | TEMPERATURE: 97.5 F | DIASTOLIC BLOOD PRESSURE: 94 MMHG | WEIGHT: 230.9 LBS | HEART RATE: 84 BPM | OXYGEN SATURATION: 95 % | SYSTOLIC BLOOD PRESSURE: 136 MMHG

## 2017-11-20 LAB
ALBUMIN SERPL-MCNC: 3.1 G/DL (ref 3.5–5.2)
ALP BLD-CCNC: 119 U/L (ref 35–104)
ALT SERPL-CCNC: 32 U/L (ref 5–33)
ANION GAP SERPL CALCULATED.3IONS-SCNC: 12 MMOL/L (ref 7–19)
AST SERPL-CCNC: 19 U/L (ref 5–32)
BASOPHILS ABSOLUTE: 0 K/UL (ref 0–0.2)
BASOPHILS RELATIVE PERCENT: 0.3 % (ref 0–1)
BILIRUB SERPL-MCNC: <0.2 MG/DL (ref 0.2–1.2)
BLOOD CULTURE, ROUTINE: NORMAL
BUN BLDV-MCNC: 18 MG/DL (ref 6–20)
CALCIUM SERPL-MCNC: 9.1 MG/DL (ref 8.6–10)
CHLORIDE BLD-SCNC: 100 MMOL/L (ref 98–111)
CO2: 27 MMOL/L (ref 22–29)
CREAT SERPL-MCNC: 0.7 MG/DL (ref 0.5–0.9)
CULTURE, BLOOD 2: NORMAL
EOSINOPHILS ABSOLUTE: 0 K/UL (ref 0–0.6)
EOSINOPHILS RELATIVE PERCENT: 0.2 % (ref 0–5)
GFR NON-AFRICAN AMERICAN: >60
GLUCOSE BLD-MCNC: 117 MG/DL (ref 74–109)
HCT VFR BLD CALC: 36 % (ref 37–47)
HEMOGLOBIN: 11.3 G/DL (ref 12–16)
LYMPHOCYTES ABSOLUTE: 2.1 K/UL (ref 1.1–4.5)
LYMPHOCYTES RELATIVE PERCENT: 14.8 % (ref 20–40)
MCH RBC QN AUTO: 27.8 PG (ref 27–31)
MCHC RBC AUTO-ENTMCNC: 31.4 G/DL (ref 33–37)
MCV RBC AUTO: 88.5 FL (ref 81–99)
MONOCYTES ABSOLUTE: 0.5 K/UL (ref 0–0.9)
MONOCYTES RELATIVE PERCENT: 3.6 % (ref 0–10)
NEUTROPHILS ABSOLUTE: 11.2 K/UL (ref 1.5–7.5)
NEUTROPHILS RELATIVE PERCENT: 78.6 % (ref 50–65)
PDW BLD-RTO: 14.6 % (ref 11.5–14.5)
PLATELET # BLD: 403 K/UL (ref 130–400)
PMV BLD AUTO: 9.5 FL (ref 9.4–12.3)
POTASSIUM SERPL-SCNC: 4.3 MMOL/L (ref 3.5–5)
RBC # BLD: 4.07 M/UL (ref 4.2–5.4)
SODIUM BLD-SCNC: 139 MMOL/L (ref 136–145)
TOTAL PROTEIN: 6.5 G/DL (ref 6.6–8.7)
WBC # BLD: 14.2 K/UL (ref 4.8–10.8)

## 2017-11-20 PROCEDURE — 6370000000 HC RX 637 (ALT 250 FOR IP): Performed by: HOSPITALIST

## 2017-11-20 PROCEDURE — 80053 COMPREHEN METABOLIC PANEL: CPT

## 2017-11-20 PROCEDURE — 36415 COLL VENOUS BLD VENIPUNCTURE: CPT

## 2017-11-20 PROCEDURE — 6360000002 HC RX W HCPCS: Performed by: HOSPITALIST

## 2017-11-20 PROCEDURE — 99238 HOSP IP/OBS DSCHRG MGMT 30/<: CPT | Performed by: NURSE PRACTITIONER

## 2017-11-20 PROCEDURE — 99231 SBSQ HOSP IP/OBS SF/LOW 25: CPT | Performed by: INTERNAL MEDICINE

## 2017-11-20 PROCEDURE — 6370000000 HC RX 637 (ALT 250 FOR IP): Performed by: OBSTETRICS & GYNECOLOGY

## 2017-11-20 PROCEDURE — 99233 SBSQ HOSP IP/OBS HIGH 50: CPT | Performed by: HOSPITALIST

## 2017-11-20 PROCEDURE — 6370000000 HC RX 637 (ALT 250 FOR IP): Performed by: INTERNAL MEDICINE

## 2017-11-20 PROCEDURE — 6370000000 HC RX 637 (ALT 250 FOR IP)

## 2017-11-20 PROCEDURE — 85025 COMPLETE CBC W/AUTO DIFF WBC: CPT

## 2017-11-20 PROCEDURE — 94640 AIRWAY INHALATION TREATMENT: CPT

## 2017-11-20 RX ORDER — OXYCODONE HYDROCHLORIDE AND ACETAMINOPHEN 5; 325 MG/1; MG/1
2 TABLET ORAL EVERY 4 HOURS PRN
Qty: 20 TABLET | Refills: 0 | Status: SHIPPED | OUTPATIENT
Start: 2017-11-20 | End: 2017-11-23

## 2017-11-20 RX ORDER — NIFEDIPINE 60 MG/1
60 TABLET, FILM COATED, EXTENDED RELEASE ORAL 2 TIMES DAILY
Qty: 30 TABLET | Refills: 3 | Status: SHIPPED | OUTPATIENT
Start: 2017-11-20 | End: 2018-01-02

## 2017-11-20 RX ORDER — GUAIFENESIN 600 MG/1
600 TABLET, EXTENDED RELEASE ORAL EVERY 6 HOURS
Qty: 20 TABLET | Refills: 0 | Status: SHIPPED | OUTPATIENT
Start: 2017-11-20 | End: 2018-01-02 | Stop reason: ALTCHOICE

## 2017-11-20 RX ORDER — HYDRALAZINE HYDROCHLORIDE 25 MG/1
25 TABLET, FILM COATED ORAL EVERY 8 HOURS SCHEDULED
Status: DISCONTINUED | OUTPATIENT
Start: 2017-11-20 | End: 2017-11-20 | Stop reason: HOSPADM

## 2017-11-20 RX ORDER — PREDNISONE 20 MG/1
TABLET ORAL
Qty: 30 TABLET | Refills: 0 | Status: SHIPPED | OUTPATIENT
Start: 2017-11-20 | End: 2017-11-28

## 2017-11-20 RX ORDER — CARVEDILOL 25 MG/1
25 TABLET ORAL 2 TIMES DAILY WITH MEALS
Qty: 60 TABLET | Refills: 3 | Status: SHIPPED | OUTPATIENT
Start: 2017-11-20 | End: 2018-03-11 | Stop reason: SDUPTHER

## 2017-11-20 RX ORDER — IBUPROFEN 800 MG/1
800 TABLET ORAL EVERY 6 HOURS PRN
Qty: 90 TABLET | Refills: 1 | Status: SHIPPED | OUTPATIENT
Start: 2017-11-20 | End: 2018-01-06 | Stop reason: SDUPTHER

## 2017-11-20 RX ORDER — HYDRALAZINE HYDROCHLORIDE 25 MG/1
25 TABLET, FILM COATED ORAL EVERY 8 HOURS SCHEDULED
Qty: 90 TABLET | Refills: 3 | Status: SHIPPED | OUTPATIENT
Start: 2017-11-20 | End: 2017-11-29 | Stop reason: ALTCHOICE

## 2017-11-20 RX ADMIN — PREDNISONE 20 MG: 20 TABLET ORAL at 08:41

## 2017-11-20 RX ADMIN — OXYCODONE HYDROCHLORIDE AND ACETAMINOPHEN 2 TABLET: 5; 325 TABLET ORAL at 13:08

## 2017-11-20 RX ADMIN — CARVEDILOL 25 MG: 25 TABLET, FILM COATED ORAL at 08:41

## 2017-11-20 RX ADMIN — FLUOXETINE 40 MG: 20 CAPSULE ORAL at 08:41

## 2017-11-20 RX ADMIN — DOCUSATE SODIUM 100 MG: 100 CAPSULE, LIQUID FILLED ORAL at 08:41

## 2017-11-20 RX ADMIN — HYDRALAZINE HYDROCHLORIDE 25 MG: 25 TABLET, FILM COATED ORAL at 08:44

## 2017-11-20 RX ADMIN — ALBUTEROL SULFATE 2.5 MG: 2.5 SOLUTION RESPIRATORY (INHALATION) at 07:46

## 2017-11-20 RX ADMIN — GUAIFENESIN 600 MG: 600 TABLET, EXTENDED RELEASE ORAL at 05:40

## 2017-11-20 RX ADMIN — OXYCODONE HYDROCHLORIDE AND ACETAMINOPHEN 1 TABLET: 5; 325 TABLET ORAL at 05:41

## 2017-11-20 RX ADMIN — MINERAL OIL, PETROLATUM, PHENYLEPHRINE HYDROCHLORIDE: 140; 749; 2.5 OINTMENT RECTAL; TOPICAL at 05:42

## 2017-11-20 RX ADMIN — NIFEDIPINE 60 MG: 60 TABLET, FILM COATED, EXTENDED RELEASE ORAL at 08:41

## 2017-11-20 RX ADMIN — FUROSEMIDE 40 MG: 40 TABLET ORAL at 08:41

## 2017-11-20 RX ADMIN — HYDRALAZINE HYDROCHLORIDE 25 MG: 25 TABLET, FILM COATED ORAL at 13:07

## 2017-11-20 RX ADMIN — GUAIFENESIN 600 MG: 600 TABLET, EXTENDED RELEASE ORAL at 13:07

## 2017-11-20 RX ADMIN — FERROUS SULFATE TAB 325 MG (65 MG ELEMENTAL FE) 325 MG: 325 (65 FE) TAB at 08:41

## 2017-11-20 ASSESSMENT — PAIN SCALES - GENERAL
PAINLEVEL_OUTOF10: 6
PAINLEVEL_OUTOF10: 4
PAINLEVEL_OUTOF10: 4
PAINLEVEL_OUTOF10: 3

## 2017-11-20 NOTE — PROGRESS NOTES
preclapsia    Plan:     - Continue coreg, nifedipine, and start hydralazine 25mg  - BP not controlled, defer to OB concerning decision to discharge    Kassie Marquis MD  [facility]    11/20/2017 8:50 AM

## 2017-11-28 ENCOUNTER — POSTPARTUM VISIT (OUTPATIENT)
Dept: OBGYN | Age: 37
End: 2017-11-28
Payer: MEDICAID

## 2017-11-28 VITALS
DIASTOLIC BLOOD PRESSURE: 55 MMHG | BODY MASS INDEX: 40.75 KG/M2 | WEIGHT: 230 LBS | HEIGHT: 63 IN | TEMPERATURE: 96 F | SYSTOLIC BLOOD PRESSURE: 85 MMHG

## 2017-11-28 PROCEDURE — 99212 OFFICE O/P EST SF 10 MIN: CPT | Performed by: OBSTETRICS & GYNECOLOGY

## 2017-11-28 RX ORDER — OXYCODONE AND ACETAMINOPHEN 7.5; 325 MG/1; MG/1
1 TABLET ORAL EVERY 4 HOURS PRN
COMMUNITY
End: 2018-01-02 | Stop reason: ALTCHOICE

## 2017-11-28 ASSESSMENT — ENCOUNTER SYMPTOMS
RESPIRATORY NEGATIVE: 1
GASTROINTESTINAL NEGATIVE: 1
EYES NEGATIVE: 1

## 2017-11-28 NOTE — PATIENT INSTRUCTIONS
Patient Education         Section: What to Expect at Home  Your Recovery    A  section, or , is surgery to deliver your baby through a cut, called an incision, that the doctor makes in your lower belly and uterus. You may have some pain in your lower belly and need pain medicine for 1 to 2 weeks. You can expect some vaginal bleeding for several weeks. You will probably need about 6 weeks to fully recover. It is important to take it easy while the incision is healing. Avoid heavy lifting, strenuous activities, or exercises that strain the belly muscles while you are recovering. Ask a family member or friend for help with housework, cooking, and shopping. This care sheet gives you a general idea about how long it will take for you to recover. But each person recovers at a different pace. Follow the steps below to get better as quickly as possible. How can you care for yourself at home? Activity  · Rest when you feel tired. Getting enough sleep will help you recover. · Try to walk each day. Start by walking a little more than you did the day before. Bit by bit, increase the amount you walk. Walking boosts blood flow and helps prevent pneumonia, constipation, and blood clots. · Avoid strenuous activities, such as bicycle riding, jogging, weightlifting, and aerobic exercise, for 6 weeks or until your doctor says it is okay. · Until your doctor says it is okay, do not lift anything heavier than your baby. · Do not do sit-ups or other exercises that strain the belly muscles for 6 weeks or until your doctor says it is okay. · Hold a pillow over your incision when you cough or take deep breaths. This will support your belly and decrease your pain. · You may shower as usual. Pat the incision dry when you are done. · You will have some vaginal bleeding. Wear sanitary pads. Do not douche or use tampons until your doctor says it is okay. · Ask your doctor when you can drive again.   · You the knee, thigh, or groin. ¨ Redness and swelling in your leg or groin. Watch closely for changes in your health, and be sure to contact your doctor if:  · You feel sad, anxious, or hopeless for more than a few days. · You do not get better as expected. Where can you learn more? Go to [email or web address] and sign in to your [employer] account. Enter M806 in the [employer] box to learn more about \" Section: What to Expect at Home. \"     If you do not have an account, please click on the \"Sign Up Now\" link. Current as of: 2017  Content Version: 11.3  © 4967-3768 [employer]. Care instructions adapted under license by [facility] ([facility]). If you have questions about a medical condition or this instruction, always ask your healthcare professional. [address] any warranty or liability for your use of this information.

## 2017-11-28 NOTE — PROGRESS NOTES
behavior is normal.       1. Postpartum follow-up  CBC       MEDICATIONS:  No orders of the defined types were placed in this encounter. ORDERS:  Orders Placed This Encounter   Procedures    CBC       PLAN:  Pt will stop Hydralazine. Pt will hold Procardia until she sees cardiology tomorrow. Blood obtained for CBC. F/u in 1 week or prn. All questions answered.

## 2017-11-29 ENCOUNTER — OFFICE VISIT (OUTPATIENT)
Dept: CARDIOLOGY | Age: 37
End: 2017-11-29
Payer: MEDICAID

## 2017-11-29 VITALS
BODY MASS INDEX: 40.75 KG/M2 | SYSTOLIC BLOOD PRESSURE: 130 MMHG | HEART RATE: 94 BPM | DIASTOLIC BLOOD PRESSURE: 70 MMHG | WEIGHT: 230 LBS | HEIGHT: 63 IN

## 2017-11-29 DIAGNOSIS — I10 ESSENTIAL HYPERTENSION: Primary | ICD-10-CM

## 2017-11-29 PROCEDURE — 99213 OFFICE O/P EST LOW 20 MIN: CPT | Performed by: NURSE PRACTITIONER

## 2017-11-29 PROCEDURE — G8417 CALC BMI ABV UP PARAM F/U: HCPCS | Performed by: NURSE PRACTITIONER

## 2017-11-29 PROCEDURE — 93000 ELECTROCARDIOGRAM COMPLETE: CPT | Performed by: NURSE PRACTITIONER

## 2017-11-29 PROCEDURE — 1111F DSCHRG MED/CURRENT MED MERGE: CPT | Performed by: NURSE PRACTITIONER

## 2017-11-29 PROCEDURE — G8484 FLU IMMUNIZE NO ADMIN: HCPCS | Performed by: NURSE PRACTITIONER

## 2017-11-29 PROCEDURE — 1036F TOBACCO NON-USER: CPT | Performed by: NURSE PRACTITIONER

## 2017-11-29 PROCEDURE — G8427 DOCREV CUR MEDS BY ELIG CLIN: HCPCS | Performed by: NURSE PRACTITIONER

## 2017-11-29 NOTE — PROGRESS NOTES
Dear Maria Iraheta MD & Speedy Owens MD,    Thank you for allowing me to participate in the care of Ms. Payton Watson. She presents today at the [address] [place] [address] in the [facility]. As you know, Ms. Franny Bautista is a 30 y.o. female with history of hypertension, asthma, and post partum  delivery date 11/10/2017  who presents with the chief complaint of hospital follow up of hypertensive urgency. She is a patient of Dr. Ben Avila. Mrs. Shelia Zhang underwent  related to preeclampsia on 11/10/2017. She was seen in the hospital by Dr. Oral Patton for hypertensive urgency. She was on lobetalol as she had hypertension with her pregnancy. She was taken off of the Lobetalol and placed on Coreg, Nifidepine, and hydralazine. She was seen by her OB-GYN yesterday and taken off of the hydralazine as her BP was 88/55 in the office. She was also asked to hold the nifedipine until seen by cardiology. She states otherwise her BP has done well. She states she has an URI currently and is on antibiotic for this. She states her asthma became worse with her pregnancy and she is having to use her albuterol a couple of times a day. She otherwise denies chest pain, HELM, PND, orthopnea, syncope or near syncope. She has no other complaints. Review of Systems    Constitutional: Negative for fever, chills, diaphoresis, activity change, appetite change, fatigue and unexpected weight change. Eyes: Negative for photophobia, pain, redness and visual disturbance. Respiratory: Negative for apnea, cough, chest tightness, +shortness of breath & wheezing, no stridor. Cardiovascular: Negative for chest pain, palpitations and leg swelling. Gastrointestinal: Negative for abdominal distention. Genitourinary: Negative for dysuria, urgency and frequency. Musculoskeletal: Negative for myalgias, arthralgias and gait problem.    Skin: Negative for color change, pallor, rash and wound. Neurological: Negative for dizziness, tremors, speech difficulty, weakness and numbness. Hematological: Does not bruise/bleed easily. Psychiatric/Behavioral: Negative. Past Medical History:   Diagnosis Date    Acute blood loss anemia 2017    Asthma     Depression     Generalized anxiety disorder     Herpes simplex virus (HSV) infection     mouth sores    Hypertension     Sensitive skin     Has issues using fabric softeners and certain soaps       Past Surgical History:   Procedure Laterality Date     SECTION N/A 11/10/2017     SECTION performed by Jose [facility] at [facility]     SECTION, LOW TRANSVERSE         Family History   Problem Relation Age of Onset    Hypertension Mother     Stroke Mother     Breast Cancer Maternal Grandmother 64    Heart Failure Maternal Grandmother     Lung Cancer Maternal Grandfather     Mental Retardation Brother     ADHD Brother     Other Brother      Autism        Social History     Social History    Marital status: Legally      Spouse name: N/A    Number of children: N/A    Years of education: N/A     Occupational History    Not on file.      Social History Main Topics    Smoking status: Former Smoker     Quit date: 2015    Smokeless tobacco: Never Used    Alcohol use No    Drug use: No    Sexual activity: Yes     Partners: Male     Other Topics Concern    Not on file     Social History Narrative    No narrative on file       Allergies   Allergen Reactions    Vinyl Ether          Current Outpatient Prescriptions:     oxyCODONE-acetaminophen (PERCOCET) 7.5-325 MG per tablet, Take 1 tablet by mouth every 4 hours as needed for Pain ., Disp: , Rfl:     ibuprofen (ADVIL;MOTRIN) 800 MG tablet, Take 1 tablet by mouth every 6 hours as needed for Pain, Disp: 90 tablet, Rfl: 1    carvedilol (COREG) 25 MG tablet, Take 1 tablet by mouth 2 times daily (with meals), Disp: 60 tablet, Rfl: CREATININE 0.6 11/19/2017    CREATININE 0.6 11/18/2017    HGB 11.3 11/20/2017    HGB 11.0 11/19/2017    HGB 10.7 11/18/2017    PROBNP 726 11/15/2017    PROBNP 571 11/14/2017       ECG 11/29/17  Normal sinus rhythm and 94 BPM    ECHO 11/13/2017  Summary   Mild concentric left ventricular hypertrophy.   Left ventricular size is normal .   Borderline ejection fraction with an ejection fraction of 45-50%      Signature      ----------------------------------------------------------------   Electronically signed by Reno Reagan MD(Interpreting   physician) on 11/13/2017 05:43 PM    Assessment    1. Essential hypertension          Plan:    Hydralazine discontinued yesterday. She will decrease the Nifedipine from 60 mg BID to daily. She is to keep a BP log and to notify us if her BP drops or elevates. Follow up with Dr. Emerald Danielle in 2 months and if stable will discharge back to PCP and follow up PRN. Please do not hesitate to contact me for any questions or concerns.     Sincerely yours,    OTTONIEL Garcia

## 2017-12-05 ENCOUNTER — POSTPARTUM VISIT (OUTPATIENT)
Dept: OBGYN | Age: 37
End: 2017-12-05

## 2017-12-05 VITALS
WEIGHT: 226 LBS | HEIGHT: 63 IN | TEMPERATURE: 98.2 F | SYSTOLIC BLOOD PRESSURE: 117 MMHG | BODY MASS INDEX: 40.04 KG/M2 | DIASTOLIC BLOOD PRESSURE: 83 MMHG | HEART RATE: 74 BPM

## 2017-12-05 PROCEDURE — G8417 CALC BMI ABV UP PARAM F/U: HCPCS | Performed by: OBSTETRICS & GYNECOLOGY

## 2017-12-05 PROCEDURE — 1111F DSCHRG MED/CURRENT MED MERGE: CPT | Performed by: OBSTETRICS & GYNECOLOGY

## 2017-12-05 PROCEDURE — 1036F TOBACCO NON-USER: CPT | Performed by: OBSTETRICS & GYNECOLOGY

## 2017-12-05 PROCEDURE — G8484 FLU IMMUNIZE NO ADMIN: HCPCS | Performed by: OBSTETRICS & GYNECOLOGY

## 2017-12-05 PROCEDURE — G8427 DOCREV CUR MEDS BY ELIG CLIN: HCPCS | Performed by: OBSTETRICS & GYNECOLOGY

## 2017-12-05 PROCEDURE — 99024 POSTOP FOLLOW-UP VISIT: CPT | Performed by: OBSTETRICS & GYNECOLOGY

## 2017-12-05 ASSESSMENT — ENCOUNTER SYMPTOMS
EYES NEGATIVE: 1
RESPIRATORY NEGATIVE: 1
GASTROINTESTINAL NEGATIVE: 1

## 2017-12-05 NOTE — PROGRESS NOTES
medications for this visit. Allergies: Vinyl ether  Past Medical History:   Diagnosis Date    Acute blood loss anemia 2017    Asthma     Depression     Generalized anxiety disorder     Herpes simplex virus (HSV) infection     mouth sores    Hypertension     Sensitive skin     Has issues using fabric softeners and certain soaps     Past Surgical History:   Procedure Laterality Date     SECTION N/A 11/10/2017     SECTION performed by Fartun Adrian MD at [address] L&D OR     SECTION, LOW TRANSVERSE       Family History   Problem Relation Age of Onset    Hypertension Mother     Stroke Mother     Breast Cancer Maternal Grandmother 64    Heart Failure Maternal Grandmother     Lung Cancer Maternal Grandfather     Mental Retardation Brother     ADHD Brother     Other Brother      Autism      Social History   Substance Use Topics    Smoking status: Former Smoker     Quit date: 2015    Smokeless tobacco: Never Used    Alcohol use No       Review of Systems   Constitutional: Negative. HENT: Negative. Eyes: Negative. Respiratory: Negative. Cardiovascular: Negative. Gastrointestinal: Negative. Genitourinary: Negative. Musculoskeletal: Negative. Skin: Negative. Neurological: Negative. Psychiatric/Behavioral: Negative. Objective:   Physical Exam   Constitutional: She is oriented to person, place, and time. She appears well-developed and well-nourished. HENT:   Head: Normocephalic and atraumatic. Eyes: Pupils are equal, round, and reactive to light. Neck: Normal range of motion. Musculoskeletal: Normal range of motion. Neurological: She is alert and oriented to person, place, and time. Skin: Skin is warm and dry. Psychiatric: She has a normal mood and affect. Her behavior is normal.       Assessment:      1. Postpartum follow-up            Plan:      Pt will continue Coreg BID and monitoring b/p at home.  Pt will f/u in 2 weeks

## 2017-12-05 NOTE — PATIENT INSTRUCTIONS
Patient Education         Section: What to Expect at Home  Your Recovery    A  section, or , is surgery to deliver your baby through a cut, called an incision, that the doctor makes in your lower belly and uterus. You may have some pain in your lower belly and need pain medicine for 1 to 2 weeks. You can expect some vaginal bleeding for several weeks. You will probably need about 6 weeks to fully recover. It is important to take it easy while the incision is healing. Avoid heavy lifting, strenuous activities, or exercises that strain the belly muscles while you are recovering. Ask a family member or friend for help with housework, cooking, and shopping. This care sheet gives you a general idea about how long it will take for you to recover. But each person recovers at a different pace. Follow the steps below to get better as quickly as possible. How can you care for yourself at home? Activity  · Rest when you feel tired. Getting enough sleep will help you recover. · Try to walk each day. Start by walking a little more than you did the day before. Bit by bit, increase the amount you walk. Walking boosts blood flow and helps prevent pneumonia, constipation, and blood clots. · Avoid strenuous activities, such as bicycle riding, jogging, weightlifting, and aerobic exercise, for 6 weeks or until your doctor says it is okay. · Until your doctor says it is okay, do not lift anything heavier than your baby. · Do not do sit-ups or other exercises that strain the belly muscles for 6 weeks or until your doctor says it is okay. · Hold a pillow over your incision when you cough or take deep breaths. This will support your belly and decrease your pain. · You may shower as usual. Pat the incision dry when you are done. · You will have some vaginal bleeding. Wear sanitary pads. Do not douche or use tampons until your doctor says it is okay. · Ask your doctor when you can drive again.   · You will probably need to take at least 6 weeks off work. It depends on the type of work you do and how you feel. · Ask your doctor when it is okay for you to have sex. Diet  · You can eat your normal diet. If your stomach is upset, try bland, low-fat foods like plain rice, broiled chicken, toast, and yogurt. · Drink plenty of fluids (unless your doctor tells you not to). · You may notice that your bowel movements are not regular right after your surgery. This is common. Try to avoid constipation and straining with bowel movements. You may want to take a fiber supplement every day. If you have not had a bowel movement after a couple of days, ask your doctor about taking a mild laxative. · If you are breastfeeding, do not drink any alcohol. Medicines  · Your doctor will tell you if and when you can restart your medicines. He or she will also give you instructions about taking any new medicines. · If you take blood thinners, such as warfarin (Coumadin), clopidogrel (Plavix), or aspirin, be sure to talk to your doctor. He or she will tell you if and when to start taking those medicines again. Make sure that you understand exactly what your doctor wants you to do. · Take pain medicines exactly as directed. ¨ If the doctor gave you a prescription medicine for pain, take it as prescribed. ¨ If you are not taking a prescription pain medicine, ask your doctor if you can take an over-the-counter medicine. · If you think your pain medicine is making you sick to your stomach:  ¨ Take your medicine after meals (unless your doctor has told you not to). ¨ Ask your doctor for a different pain medicine. · If your doctor prescribed antibiotics, take them as directed. Do not stop taking them just because you feel better. You need to take the full course of antibiotics. Incision care  · If you have strips of tape on the incision, leave the tape on for a week or until it falls off.   · Wash the area daily with warm, soapy water, and pat it dry. Don't use hydrogen peroxide or alcohol, which can slow healing. You may cover the area with a gauze bandage if it weeps or rubs against clothing. Change the bandage every day. · Keep the area clean and dry. Other instructions  · If you breastfeed your baby, you may be more comfortable while you are healing if you place the baby so that he or she is not resting on your belly. Try tucking your baby under your arm, with his or her body along the side you will be feeding on. Support your baby's upper body with your arm. With that hand you can control your baby's head to bring his or her mouth to your breast. This is sometimes called the football hold. Follow-up care is a key part of your treatment and safety. Be sure to make and go to all appointments, and call your doctor if you are having problems. It's also a good idea to know your test results and keep a list of the medicines you take. When should you call for help? Call 911 anytime you think you may need emergency care. For example, call if:  · You passed out (lost consciousness). · You have symptoms of a blood clot in your lung (called a pulmonary embolism). These may include:  ¨ Sudden chest pain. ¨ Trouble breathing. ¨ Coughing up blood. · You have thoughts of harming yourself, your baby, or another person. Call your doctor now or seek immediate medical care if:  · You have severe vaginal bleeding. This means that you are soaking through a pad every hour for 2 or more hours. · You are dizzy or lightheaded, or you feel like you may faint. · You have new or more belly pain. · You have loose stitches, or your incision comes open. · You have symptoms of infection, such as:  ¨ Increased pain, swelling, warmth, or redness. ¨ Red streaks leading from the incision. ¨ Pus draining from the incision. ¨ A fever.   · You have symptoms of a blood clot in your leg (called a deep vein thrombosis), such as:  ¨ Pain in your calf, back of the knee, thigh, or groin. ¨ Redness and swelling in your leg or groin. Watch closely for changes in your health, and be sure to contact your doctor if:  · You feel sad, anxious, or hopeless for more than a few days. · You do not get better as expected. Where can you learn more? Go to [email or web address] and sign in to your [employer] account. Enter M806 in the [employer] box to learn more about \" Section: What to Expect at Home. \"     If you do not have an account, please click on the \"Sign Up Now\" link. Current as of: 2017  Content Version: 11.3  © 5370-5384 [employer]. Care instructions adapted under license by [facility] ([facility]). If you have questions about a medical condition or this instruction, always ask your healthcare professional. [address] any warranty or liability for your use of this information.

## 2017-12-22 RX ORDER — FLUOXETINE HYDROCHLORIDE 40 MG/1
CAPSULE ORAL
Qty: 30 CAPSULE | Refills: 0 | Status: SHIPPED | OUTPATIENT
Start: 2017-12-22 | End: 2018-01-02

## 2018-01-02 ENCOUNTER — POSTPARTUM VISIT (OUTPATIENT)
Dept: OBGYN | Age: 38
End: 2018-01-02
Payer: MEDICAID

## 2018-01-02 VITALS
HEART RATE: 68 BPM | WEIGHT: 233 LBS | HEIGHT: 63 IN | DIASTOLIC BLOOD PRESSURE: 91 MMHG | SYSTOLIC BLOOD PRESSURE: 135 MMHG | TEMPERATURE: 98 F | BODY MASS INDEX: 41.29 KG/M2

## 2018-01-02 DIAGNOSIS — Z01.30 BLOOD PRESSURE CHECK: Primary | ICD-10-CM

## 2018-01-02 PROCEDURE — 99212 OFFICE O/P EST SF 10 MIN: CPT | Performed by: OBSTETRICS & GYNECOLOGY

## 2018-01-02 RX ORDER — FLUOXETINE HYDROCHLORIDE 40 MG/1
40 CAPSULE ORAL DAILY
COMMUNITY
Start: 2017-06-13 | End: 2018-05-11 | Stop reason: SDUPTHER

## 2018-01-02 ASSESSMENT — ENCOUNTER SYMPTOMS
RESPIRATORY NEGATIVE: 1
GASTROINTESTINAL NEGATIVE: 1
EYES NEGATIVE: 1

## 2018-01-02 NOTE — PROGRESS NOTES
Subjective:      Patient ID: Mikayla Fernandes is a 30 y.o. female. Patient presents today for a BP check. HPI  Pt is here for a postpartum blood pressure check. Pt is doing well. Mikayla Fernandes is a 30 y.o. female with the following history as recorded in EpicCare:  Patient Active Problem List    Diagnosis Date Noted    Acute respiratory failure with hypoxia ([facility] [address]      Priority: High    Essential hypertension      Priority: High    ARDS (adult respiratory distress syndrome) (HCC)     Severe persistent asthma with exacerbation     Acute systolic congestive heart failure (HCC) 2017    Leukocytosis 2017    Acute blood loss anemia 2017    Preeclampsia, third trimester      Current Outpatient Prescriptions   Medication Sig Dispense Refill    FLUoxetine (PROZAC) 40 MG capsule Take 40 mg by mouth daily      ibuprofen (ADVIL;MOTRIN) 800 MG tablet Take 1 tablet by mouth every 6 hours as needed for Pain 90 tablet 1    carvedilol (COREG) 25 MG tablet Take 1 tablet by mouth 2 times daily (with meals) 60 tablet 3    albuterol sulfate  (90 Base) MCG/ACT inhaler Inhale 2 puffs into the lungs every 6 hours as needed for Wheezing      ferrous sulfate 325 (65 Fe) MG tablet Take 1 tablet by mouth daily (with breakfast) 90 tablet 3    Prenatal Multivit-Min-Fe-FA (PRENATAL VITAMINS PO) Take by mouth       No current facility-administered medications for this visit.       Allergies: Vinyl ether  Past Medical History:   Diagnosis Date    Acute blood loss anemia 2017    Asthma     Depression     Generalized anxiety disorder     Herpes simplex virus (HSV) infection     mouth sores    Hypertension     Sensitive skin     Has issues using fabric softeners and certain soaps     Past Surgical History:   Procedure Laterality Date     SECTION N/A 11/10/2017     SE[facility] [address] [facility]     SECTION, LOW TRANSVERSE

## 2018-01-02 NOTE — PATIENT INSTRUCTIONS
serving is 1 slice of bread, 1 ounce of dry cereal, or ½ cup of cooked rice, pasta, or cooked cereal. Try to choose whole-grain products as much as possible. · Limit lean meat, poultry, and fish to 2 servings each day. A serving is 3 ounces, about the size of a deck of cards. · Eat 4 to 5 servings of nuts, seeds, and legumes (cooked dried beans, lentils, and split peas) each week. A serving is 1/3 cup of nuts, 2 tablespoons of seeds, or ½ cup of cooked beans or peas. · Limit fats and oils to 2 to 3 servings each day. A serving is 1 teaspoon of vegetable oil or 2 tablespoons of salad dressing. · Limit sweets and added sugars to 5 servings or less a week. A serving is 1 tablespoon jelly or jam, ½ cup sorbet, or 1 cup of lemonade. · Eat less than 2,300 milligrams (mg) of sodium a day. If you limit your sodium to 1,500 mg a day, you can lower your blood pressure even more. Tips for success  · Start small. Do not try to make dramatic changes to your diet all at once. You might feel that you are missing out on your favorite foods and then be more likely to not follow the plan. Make small changes, and stick with them. Once those changes become habit, add a few more changes. · Try some of the following:  ¨ Make it a goal to eat a fruit or vegetable at every meal and at snacks. This will make it easy to get the recommended amount of fruits and vegetables each day. ¨ Try yogurt topped with fruit and nuts for a snack or healthy dessert. ¨ Add lettuce, tomato, cucumber, and onion to sandwiches. ¨ Combine a ready-made pizza crust with low-fat mozzarella cheese and lots of vegetable toppings. Try using tomatoes, squash, spinach, broccoli, carrots, cauliflower, and onions. ¨ Have a variety of cut-up vegetables with a low-fat dip as an appetizer instead of chips and dip. ¨ Sprinkle sunflower seeds or chopped almonds over salads. Or try adding chopped walnuts or almonds to cooked vegetables.   ¨ Try some vegetarian meals

## 2018-01-08 RX ORDER — IBUPROFEN 800 MG/1
800 TABLET ORAL EVERY 6 HOURS PRN
Qty: 90 TABLET | Refills: 1 | Status: SHIPPED | OUTPATIENT
Start: 2018-01-08

## 2018-01-11 ENCOUNTER — TELEPHONE (OUTPATIENT)
Dept: OBGYN | Age: 38
End: 2018-01-11

## 2018-01-22 RX ORDER — FLUOXETINE HYDROCHLORIDE 40 MG/1
CAPSULE ORAL
Qty: 30 CAPSULE | Refills: 0 | Status: SHIPPED | OUTPATIENT
Start: 2018-01-22 | End: 2018-02-07 | Stop reason: SDUPTHER

## 2018-02-07 ENCOUNTER — PROCEDURE VISIT (OUTPATIENT)
Dept: OBGYN | Age: 38
End: 2018-02-07
Payer: MEDICAID

## 2018-02-07 VITALS
BODY MASS INDEX: 42.17 KG/M2 | SYSTOLIC BLOOD PRESSURE: 166 MMHG | TEMPERATURE: 98 F | HEART RATE: 83 BPM | DIASTOLIC BLOOD PRESSURE: 94 MMHG | WEIGHT: 238 LBS | HEIGHT: 63 IN

## 2018-02-07 DIAGNOSIS — Z30.018 ENCOUNTER FOR INITIAL PRESCRIPTION OF OTHER CONTRACEPTIVES: Primary | ICD-10-CM

## 2018-02-07 LAB
CONTROL: NORMAL
PREGNANCY TEST URINE, POC: NEGATIVE

## 2018-02-07 PROCEDURE — 11981 INSERTION DRUG DLVR IMPLANT: CPT | Performed by: OBSTETRICS & GYNECOLOGY

## 2018-02-07 PROCEDURE — 81025 URINE PREGNANCY TEST: CPT | Performed by: OBSTETRICS & GYNECOLOGY

## 2018-02-07 RX ORDER — NIFEDIPINE 30 MG/1
30 TABLET, EXTENDED RELEASE ORAL DAILY
Qty: 30 TABLET | Refills: 11 | Status: SHIPPED | OUTPATIENT
Start: 2018-02-07 | End: 2019-02-09 | Stop reason: SDUPTHER

## 2018-02-07 ASSESSMENT — ENCOUNTER SYMPTOMS
GASTROINTESTINAL NEGATIVE: 1
EYES NEGATIVE: 1
RESPIRATORY NEGATIVE: 1

## 2018-02-07 NOTE — PROGRESS NOTES
performed by Cedrick Esparza MD at [address] [facility] L&D [place]     SECTION, LOW TRANSVERSE       Family History   Problem Relation Age of Onset    Hypertension Mother     Stroke Mother     Breast Cancer Maternal Grandmother 64    Heart Failure Maternal Grandmother     Lung Cancer Maternal Grandfather     Mental Retardation Brother     ADHD Brother     Other Brother      Autism      Social History   Substance Use Topics    Smoking status: Former Smoker     Quit date: 2015    Smokeless tobacco: Never Used    Alcohol use No       Review of Systems   Constitutional: Negative. HENT: Negative. Eyes: Negative. Respiratory: Negative. Cardiovascular: Negative. Gastrointestinal: Negative. Genitourinary: Negative. Musculoskeletal: Negative. Skin: Negative. Neurological: Negative. Psychiatric/Behavioral: Negative. Objective:   Physical Exam   Constitutional: She is oriented to person, place, and time. She appears well-developed and well-nourished. HENT:   Head: Normocephalic and atraumatic. Eyes: Pupils are equal, round, and reactive to light. Neck: Normal range of motion. Musculoskeletal: Normal range of motion. Neurological: She is alert and oriented to person, place, and time. Skin: Skin is warm and dry. LUE area cleaned w betadine. 0.5cc 1% lidocaine used to anesthetize area under implant. #11 blade scalpel used and approximately 0.5cm incision made to distal tip of Nexplanon. Tip of device visualized and grasped with pickup and removed without difficulty. Pressure applied, benzoin apple with 1/2inch steristrip and bandaid placed over site. Patient tolerated procedure without difficulty. Psychiatric: She has a normal mood and affect. Her behavior is normal.       Assessment:      1.  Encounter for initial prescription of other contraceptives            Plan:      MEDICATIONS:  Orders Placed This Encounter   Medications    NIFEdipine (PROCARDIA XL) 30 MG extended release tablet     Sig: Take 1 tablet by mouth daily     Dispense:  30 tablet     Refill:  11       ORDERS:  Orders Placed This Encounter   Procedures    POCT urine pregnancy    AR INSERTION DRUG IMPLANT DEVICE     Nexplanon inserted. Pt will add Procardia to Coreg and RX sent in.  F/u in 3 months for annual.

## 2018-02-07 NOTE — PATIENT INSTRUCTIONS
prevent pregnancy when combined with other medicines. · The implant doesn't protect against sexually transmitted infections (STIs), such as herpes or HIV/AIDS. If you're not sure if your sex partner might have an STI, use a condom to protect against infection. When should you call for help? Watch closely for changes in your health, and be sure to contact your doctor if you have any problems. Where can you learn more? Go to [email or web address]://[email or web address]. [facility] and sign in to your [employer] account. Enter Q033 in the [employer] box to learn more about \"Implant for Birth Control: Care Instructions. \"     If you do not have an account, please click on the \"Sign Up Now\" link. Current as of: March 16, 2017  Content Version: 11.5  © 8477-8731 [employer]. Care instructions adapted under license by [facility] ([facility]). If you have questions about a medical condition or this instruction, always ask your healthcare professional. [address] any warranty or liability for your use of this information.

## 2018-02-12 ENCOUNTER — TELEPHONE (OUTPATIENT)
Dept: OBGYN | Age: 38
End: 2018-02-12

## 2018-02-12 NOTE — TELEPHONE ENCOUNTER
Pt called about her nexplanon and wanted to know if she should be concerned that is was tender a week later. No redness, swelling, streaking, rashes noted. Advised pt it may just take a few more days for her body to adjust to having it. PT v/u.

## 2018-02-26 RX ORDER — FLUOXETINE HYDROCHLORIDE 40 MG/1
CAPSULE ORAL
Qty: 30 CAPSULE | Refills: 0 | Status: SHIPPED | OUTPATIENT
Start: 2018-02-26 | End: 2018-04-04 | Stop reason: SDUPTHER

## 2018-03-12 RX ORDER — CARVEDILOL 25 MG/1
25 TABLET ORAL 2 TIMES DAILY WITH MEALS
Qty: 60 TABLET | Refills: 3 | Status: SHIPPED | OUTPATIENT
Start: 2018-03-12 | End: 2018-08-06 | Stop reason: SDUPTHER

## 2018-03-12 RX ORDER — HYDRALAZINE HYDROCHLORIDE 25 MG/1
25 TABLET, FILM COATED ORAL EVERY 8 HOURS SCHEDULED
Qty: 90 TABLET | Refills: 3 | Status: SHIPPED | OUTPATIENT
Start: 2018-03-12 | End: 2018-04-10 | Stop reason: ALTCHOICE

## 2018-04-05 RX ORDER — FLUOXETINE HYDROCHLORIDE 40 MG/1
CAPSULE ORAL
Qty: 30 CAPSULE | Refills: 0 | Status: SHIPPED | OUTPATIENT
Start: 2018-04-05 | End: 2018-04-10 | Stop reason: DRUGHIGH

## 2018-04-10 ENCOUNTER — OFFICE VISIT (OUTPATIENT)
Dept: CARDIOLOGY | Age: 38
End: 2018-04-10
Payer: MEDICAID

## 2018-04-10 VITALS
SYSTOLIC BLOOD PRESSURE: 126 MMHG | HEART RATE: 72 BPM | WEIGHT: 249 LBS | DIASTOLIC BLOOD PRESSURE: 72 MMHG | BODY MASS INDEX: 44.12 KG/M2 | HEIGHT: 63 IN

## 2018-04-10 DIAGNOSIS — I10 ESSENTIAL HYPERTENSION: Primary | ICD-10-CM

## 2018-04-10 PROCEDURE — 1036F TOBACCO NON-USER: CPT | Performed by: INTERNAL MEDICINE

## 2018-04-10 PROCEDURE — G8417 CALC BMI ABV UP PARAM F/U: HCPCS | Performed by: INTERNAL MEDICINE

## 2018-04-10 PROCEDURE — 99213 OFFICE O/P EST LOW 20 MIN: CPT | Performed by: INTERNAL MEDICINE

## 2018-04-10 PROCEDURE — G8427 DOCREV CUR MEDS BY ELIG CLIN: HCPCS | Performed by: INTERNAL MEDICINE

## 2018-04-10 PROCEDURE — 93000 ELECTROCARDIOGRAM COMPLETE: CPT | Performed by: INTERNAL MEDICINE

## 2018-04-10 ASSESSMENT — ENCOUNTER SYMPTOMS: SHORTNESS OF BREATH: 0

## 2018-05-11 RX ORDER — FLUOXETINE HYDROCHLORIDE 40 MG/1
CAPSULE ORAL
Qty: 30 CAPSULE | Refills: 0 | Status: SHIPPED | OUTPATIENT
Start: 2018-05-11 | End: 2018-06-12 | Stop reason: SDUPTHER

## 2018-05-18 RX ORDER — FERROUS SULFATE 325(65) MG
325 TABLET ORAL
Qty: 90 TABLET | Refills: 3 | Status: SHIPPED | OUTPATIENT
Start: 2018-05-18 | End: 2019-01-07 | Stop reason: ALTCHOICE

## 2018-05-23 ENCOUNTER — OFFICE VISIT (OUTPATIENT)
Dept: OBGYN | Age: 38
End: 2018-05-23
Payer: MEDICAID

## 2018-05-23 VITALS
WEIGHT: 256 LBS | SYSTOLIC BLOOD PRESSURE: 134 MMHG | TEMPERATURE: 98.8 F | BODY MASS INDEX: 47.11 KG/M2 | HEART RATE: 92 BPM | DIASTOLIC BLOOD PRESSURE: 80 MMHG | HEIGHT: 62 IN

## 2018-05-23 DIAGNOSIS — Z97.5 NEXPLANON IN PLACE: ICD-10-CM

## 2018-05-23 DIAGNOSIS — Z01.419 WELL FEMALE EXAM WITH ROUTINE GYNECOLOGICAL EXAM: Primary | ICD-10-CM

## 2018-05-23 DIAGNOSIS — J45.20 MILD INTERMITTENT ASTHMA WITHOUT COMPLICATION: ICD-10-CM

## 2018-05-23 DIAGNOSIS — Z12.4 SCREENING FOR CERVICAL CANCER: ICD-10-CM

## 2018-05-23 PROCEDURE — 99395 PREV VISIT EST AGE 18-39: CPT | Performed by: OBSTETRICS & GYNECOLOGY

## 2018-05-23 RX ORDER — NYSTATIN 100000 [USP'U]/G
POWDER TOPICAL
Qty: 1 BOTTLE | Refills: 5 | Status: SHIPPED | OUTPATIENT
Start: 2018-05-23

## 2018-05-23 ASSESSMENT — ENCOUNTER SYMPTOMS
RESPIRATORY NEGATIVE: 1
EYES NEGATIVE: 1
GASTROINTESTINAL NEGATIVE: 1

## 2018-06-12 RX ORDER — FLUOXETINE HYDROCHLORIDE 40 MG/1
CAPSULE ORAL
Qty: 30 CAPSULE | Refills: 0 | Status: SHIPPED | OUTPATIENT
Start: 2018-06-12 | End: 2018-07-11 | Stop reason: SDUPTHER

## 2018-07-11 RX ORDER — FLUOXETINE HYDROCHLORIDE 40 MG/1
CAPSULE ORAL
Qty: 30 CAPSULE | Refills: 5 | Status: SHIPPED | OUTPATIENT
Start: 2018-07-11 | End: 2019-01-09 | Stop reason: SDUPTHER

## 2018-08-06 RX ORDER — CARVEDILOL 25 MG/1
25 TABLET ORAL 2 TIMES DAILY WITH MEALS
Qty: 60 TABLET | Refills: 3 | Status: SHIPPED | OUTPATIENT
Start: 2018-08-06 | End: 2018-12-10 | Stop reason: SDUPTHER

## 2018-12-10 RX ORDER — CARVEDILOL 25 MG/1
25 TABLET ORAL 2 TIMES DAILY WITH MEALS
Qty: 60 TABLET | Refills: 3 | Status: SHIPPED | OUTPATIENT
Start: 2018-12-10 | End: 2019-04-28 | Stop reason: SDUPTHER

## 2019-01-07 ENCOUNTER — OFFICE VISIT (OUTPATIENT)
Dept: CARDIOLOGY | Age: 39
End: 2019-01-07
Payer: MEDICAID

## 2019-01-07 VITALS
WEIGHT: 259 LBS | DIASTOLIC BLOOD PRESSURE: 88 MMHG | SYSTOLIC BLOOD PRESSURE: 138 MMHG | HEART RATE: 80 BPM | BODY MASS INDEX: 47.66 KG/M2 | HEIGHT: 62 IN

## 2019-01-07 DIAGNOSIS — I10 ESSENTIAL HYPERTENSION: Primary | ICD-10-CM

## 2019-01-07 PROCEDURE — 1036F TOBACCO NON-USER: CPT | Performed by: NURSE PRACTITIONER

## 2019-01-07 PROCEDURE — G8484 FLU IMMUNIZE NO ADMIN: HCPCS | Performed by: NURSE PRACTITIONER

## 2019-01-07 PROCEDURE — 93000 ELECTROCARDIOGRAM COMPLETE: CPT | Performed by: NURSE PRACTITIONER

## 2019-01-07 PROCEDURE — G8427 DOCREV CUR MEDS BY ELIG CLIN: HCPCS | Performed by: NURSE PRACTITIONER

## 2019-01-07 PROCEDURE — 99213 OFFICE O/P EST LOW 20 MIN: CPT | Performed by: NURSE PRACTITIONER

## 2019-01-07 PROCEDURE — G8417 CALC BMI ABV UP PARAM F/U: HCPCS | Performed by: NURSE PRACTITIONER

## 2019-01-09 RX ORDER — FLUOXETINE HYDROCHLORIDE 40 MG/1
CAPSULE ORAL
Qty: 30 CAPSULE | Refills: 5 | Status: SHIPPED | OUTPATIENT
Start: 2019-01-09

## 2019-01-09 RX ORDER — HYDRALAZINE HYDROCHLORIDE 25 MG/1
25 TABLET, FILM COATED ORAL EVERY 8 HOURS SCHEDULED
Qty: 90 TABLET | Refills: 1 | Status: SHIPPED | OUTPATIENT
Start: 2019-01-09

## 2019-02-11 RX ORDER — NIFEDIPINE 30 MG/1
30 TABLET, EXTENDED RELEASE ORAL DAILY
Qty: 30 TABLET | Refills: 11 | Status: SHIPPED | OUTPATIENT
Start: 2019-02-11

## 2019-04-29 RX ORDER — CARVEDILOL 25 MG/1
25 TABLET ORAL 2 TIMES DAILY WITH MEALS
Qty: 60 TABLET | Refills: 3 | Status: SHIPPED | OUTPATIENT
Start: 2019-04-29

## 2020-01-10 ENCOUNTER — TELEPHONE (OUTPATIENT)
Dept: CARDIOLOGY | Age: 40
End: 2020-01-10

## 2020-01-10 NOTE — TELEPHONE ENCOUNTER
Dr. Stacie Rojas is wanting cholesterol labs on every patient. I do not see patient has had any within epic. Tried to call patient no answer left message.

## 2020-09-26 NOTE — PROGRESS NOTES
INTAKE/OUTPUT:      Intake/Output Summary (Last 24 hours) at 11/20/17 1219  Last data filed at 11/20/17 0845   Gross per 24 hour   Intake             1485 ml   Output             2075 ml   Net             -590 ml     Net I/O since admission (-15.6 L)    General appearance: alert and cooperative with exam, MUCH more relaxed, comfortable and calm, SO at bedside  HEENT: atraumatic, eyes with clear conjunctiva and normal lids, pupils and irises normal, external ears and nose are normal, lips normal. Neck without masses, lympadenopathy, bruit, thyroid normal  Lungs: no increased work of breathing, clear to auscultation bilaterally without rales, rhonchi or wheezes  Heart: regular rate and rhythm, S1, S2 normal, no murmur, click, rub or gallop  Abdomen: soft, non-tender; bowel sounds normal; no masses,  no organomegaly  Extremities: extremities normal, atraumatic, no cyanosis or edema  Neurologic: No focal neurologic deficits, normal sensation, alert and oriented, affect and mood appropriate.   Skin: no rashes, nodules    Medications:         hydrALAZINE  25 mg Oral 3 times per day    carvedilol  25 mg Oral BID WC    predniSONE  20 mg Oral BID    NIFEdipine  60 mg Oral BID    guaiFENesin  600 mg Oral Q6H    FLUoxetine  40 mg Oral Daily    ferrous sulfate  325 mg Oral BID WC    docusate sodium  100 mg Oral BID     Tetanus-Diphth-Acell Pertussis  0.5 mL Intramuscular Prior to discharge     phenylephrine-mineral oil-petrolatum, albuterol, LORazepam, promethazine, diphenhydrAMINE, ibuprofen, oxyCODONE-acetaminophen, ondansetron, lidocaine PF, bisacodyl, acetaminophen, lanolin, oxyCODONE-acetaminophen  DIET GENERAL;     Lab and other Data:     Recent Labs      11/18/17 0108 11/19/17   0351  11/20/17   0308   WBC  15.0*  13.8*  14.2*   HGB  10.7*  11.0*  11.3*   PLT  409*  357  403*     Recent Labs      11/18/17 0108 11/19/17   0351  11/20/17   0308   NA  137  139  139   K  3.9  4.0  4.3   CL  96*  101  100   CO2 Acute vs acute on chronic  -H&H stable  -Continue to monitor H/H, s/s active bleed   -Heme/onc, Dr. Blanc, following, recs appreciated.

## (undated) DEVICE — STERILE POLYISOPRENE POWDER-FREE SURGICAL GLOVES WITH EMOLLIENT COATING: Brand: PROTEXIS

## (undated) DEVICE — MASTISOL ADHESIVE LIQ 2/3ML

## (undated) DEVICE — MASK ROUND 50MM FPH (10) S/USE: Brand: FISHER & PAYKEL HEALTHCARE

## (undated) DEVICE — Z DISCONTINUED TAPE SURG W2INXL10YD SFT CLTH EZ TEAR HYPOALRG H2O RESIST

## (undated) DEVICE — PEN: MARKING STD 100/CS: Brand: MEDICAL ACTION INDUSTRIES

## (undated) DEVICE — MATTRESS TRANSFER X LG 39 IN LAT MAXI AIR

## (undated) DEVICE — SUTURE VCRL SZ 0 L36IN ABSRB VLT L36MM CT-1 1/2 CIR J346H

## (undated) DEVICE — GARMENT COMPR STD FOR 17IN CALF UNIF THER FLOTRN

## (undated) DEVICE — COVER LT HNDL PLAS RIG 2 PER PK

## (undated) DEVICE — SUTURE CHROMIC GUT SZ 3-0 L27IN ABSRB BRN L26MM SH 1/2 CIR G122H

## (undated) DEVICE — SUTURE VCRL SZ 0 L27IN ABSRB VLT L48MM CTX 1/2 CIR TAPR PNT J364H

## (undated) DEVICE — SPONGE LAP W18XL18IN WHT COT 4 PLY FLD STRUNG RADPQ DISP ST

## (undated) DEVICE — 3M™ STERI-STRIP™ REINFORCED ADHESIVE SKIN CLOSURES, R1547, 1/2 IN X 4 IN (12 MM X 100 MM), 6 STRIPS/ENVELOPE: Brand: 3M™ STERI-STRIP™

## (undated) DEVICE — TRAY SPINAL QUINCKE 22GA X 3.5IN

## (undated) DEVICE — BINDER ABD H12IN COT FOR 45-62IN WAIST UNIV PREM 4 PNL DSGN

## (undated) DEVICE — SUTURE VCRL + SZ 1-0 L36IN ABSRB UD CTX 1/2 CIR TAPR PNT VCP977H

## (undated) DEVICE — GAUZE,SPONGE,4"X4",8PLY,STRL,LF,10/TRAY: Brand: MEDLINE

## (undated) DEVICE — MASK ROUND 60MM FPH (10) S/USE: Brand: FISHER & PAYKEL HEALTHCARE

## (undated) DEVICE — Device

## (undated) DEVICE — BLADE SURG NO10 C STL DISP ST

## (undated) DEVICE — CATHETERIZATION TRAY PEDIATRIC 16 FR 5 CC INDWL DOVER

## (undated) DEVICE — SYRINGE EAR 2OZ ULC ST BLB FLAT BTM PVC BROAD TIP REUSE

## (undated) DEVICE — SOLUTION IV IRRIG POUR BRL 0.9% SODIUM CHL 2F7124

## (undated) DEVICE — STAPLER SKIN SQ 30 ABSRB STPL DISP INSORB